# Patient Record
Sex: MALE | Race: WHITE | NOT HISPANIC OR LATINO | Employment: FULL TIME | URBAN - NONMETROPOLITAN AREA
[De-identification: names, ages, dates, MRNs, and addresses within clinical notes are randomized per-mention and may not be internally consistent; named-entity substitution may affect disease eponyms.]

---

## 2018-09-15 ENCOUNTER — OFFICE VISIT (OUTPATIENT)
Dept: URGENT CARE | Facility: PHYSICIAN GROUP | Age: 28
End: 2018-09-15
Payer: COMMERCIAL

## 2018-09-15 VITALS
RESPIRATION RATE: 14 BRPM | WEIGHT: 265 LBS | TEMPERATURE: 98.6 F | SYSTOLIC BLOOD PRESSURE: 128 MMHG | OXYGEN SATURATION: 98 % | HEIGHT: 76 IN | DIASTOLIC BLOOD PRESSURE: 80 MMHG | HEART RATE: 66 BPM | BODY MASS INDEX: 32.27 KG/M2

## 2018-09-15 VITALS
RESPIRATION RATE: 16 BRPM | WEIGHT: 262.5 LBS | SYSTOLIC BLOOD PRESSURE: 120 MMHG | HEIGHT: 76 IN | DIASTOLIC BLOOD PRESSURE: 78 MMHG | TEMPERATURE: 98.4 F | OXYGEN SATURATION: 98 % | BODY MASS INDEX: 31.97 KG/M2 | HEART RATE: 83 BPM

## 2018-09-15 DIAGNOSIS — H93.8X9 SWELLING OF EAR, UNSPECIFIED LATERALITY: ICD-10-CM

## 2018-09-15 DIAGNOSIS — M95.11 CAULIFLOWER RIGHT EAR: ICD-10-CM

## 2018-09-15 DIAGNOSIS — S00.431A HEMATOMA OF RIGHT PINNA, INITIAL ENCOUNTER: ICD-10-CM

## 2018-09-15 PROCEDURE — 99214 OFFICE O/P EST MOD 30 MIN: CPT | Performed by: FAMILY MEDICINE

## 2018-09-15 PROCEDURE — 99214 OFFICE O/P EST MOD 30 MIN: CPT | Performed by: NURSE PRACTITIONER

## 2018-09-15 RX ORDER — KETOROLAC TROMETHAMINE 30 MG/ML
60 INJECTION, SOLUTION INTRAMUSCULAR; INTRAVENOUS ONCE
Status: COMPLETED | OUTPATIENT
Start: 2018-09-15 | End: 2018-09-15

## 2018-09-15 RX ORDER — AMOXICILLIN AND CLAVULANATE POTASSIUM 875; 125 MG/1; MG/1
1 TABLET, FILM COATED ORAL 2 TIMES DAILY
Qty: 20 TAB | Refills: 0 | Status: SHIPPED | OUTPATIENT
Start: 2018-09-15 | End: 2018-09-25

## 2018-09-15 RX ADMIN — KETOROLAC TROMETHAMINE 60 MG: 30 INJECTION, SOLUTION INTRAMUSCULAR; INTRAVENOUS at 16:32

## 2018-09-15 ASSESSMENT — PAIN SCALES - GENERAL
PAINLEVEL: 1=MINIMAL PAIN
PAINLEVEL: 1=MINIMAL PAIN

## 2018-09-15 ASSESSMENT — ENCOUNTER SYMPTOMS
CHILLS: 0
FEVER: 0

## 2018-09-15 NOTE — PROGRESS NOTES
"Subjective:       Chief Complaint   Patient presents with   • Otalgia     swollen lobe. Right side                 Otalgia -  Rt ear  This is a new problem. The current episode started 2 wks ago. The problem occurs constantly. The problem has been unchanged.   He c/o constant throbbing rt ear pain after some trauma associated with martial arts.   Ear is TTP.   Denies fever, drainage.        Pertinent negatives include no abdominal pain, chest pain, chills, fever, headaches, joint swelling, myalgias, nausea, neck pain, rash or visual change   Social hx - denies tobacco, alcohol, drug use      Past medical history was unremarkable and not pertinent to current issue      Review of Systems   Constitutional: Negative for fever and chills.   HENT:   Negative for hearing loss and tinnitus.    Respiratory: no cough. Negative for hemoptysis, shortness of breath and wheezing.    Cardiovascular: Negative for chest pain, palpitations and leg swelling.   Gastrointestinal: Negative for nausea and abdominal pain.   Musculoskeletal: Negative for myalgias, joint swelling and neck pain.   Skin: Negative for rash.   Neurological: Negative for headaches.   All other systems reviewed and are negative.         Objective:     Blood pressure 128/80, pulse 66, temperature 37 °C (98.6 °F), resp. rate 14, height 1.93 m (6' 4\"), weight 120.2 kg (265 lb), SpO2 98 %.      Physical Exam   Constitutional: Vital signs are normal. She is active. No distress.   HENT:   Head: There is normal jaw occlusion.   Right Ear:  There is erythema, edema at helix, the antihelix, and the whitney.   + TTP, but area is not fluctuant or indurated , but it is warm to touch    upon palpation of the tragus.  There is erythema, edema, and narrowing of the external auditory canal.   Slight serous discharge noted  Left Ear: External ear normal. Tympanic membrane is normal  Nose:   No nasal discharge.   Mouth/Throat: Mucous membranes are moist. No oral lesions.  No " "erythema. No oropharyngeal exudate, pharynx swelling or pharynx petechiae. No  exudate.   Eyes: Conjunctivae and EOM are normal. Pupils are equal, round, and reactive to light. Right eye exhibits no discharge. Left eye exhibits no discharge.   Neck: Normal range of motion. Neck supple.  No adenopathy  Cardiovascular: Normal rate and regular rhythm.  Pulses are palpable.    No murmur heard.  Pulmonary/Chest: Effort normal and breath sounds normal. There is normal air entry. No respiratory distress. no wheezes, rhonchi,  retraction.   Musculoskeletal:   no edema.   Neurological: A/O x 3.   CN 2-12 intact   Skin: Skin is warm. Capillary refill takes less than 3 seconds. No purpura and no rash noted. Patient is not diaphoretic. No jaundice or pallor.   Nursing note and vitals reviewed.              Assessment/Plan:       1. Hematoma of right pinna, initial encounter    Will start on bactrim for possible infection, but should be drained by ENT     - REFERRAL TO ENT      - ketorolac (TORADOL) injection 60 mg; 2 mL by Intramuscular route Once.        *after pt left, I noticed that he saw a different urgent care provider earlier in the day, who recommended similar treatment.   I called him to make sure that I do not duplicate treatment and he states that he was prescribed Augmentin, but did not start it yet.    He explained that he was looking for a \"second opinion\", but he neglected to offer me this information.   I advised him to only take the bactrim, not the Augmentin and reassured him that the proper treatment is drainage by ENT.         "

## 2018-09-15 NOTE — PATIENT INSTRUCTIONS
Auricle Injuries  You have an injury to your external ear (auricle). The ear has a layer of skin over cartilage. A cut or bruise to the ear can separate the skin from the cartilage underneath. This can cause problems with healing if blood gathers between the skin and the cartilage. Permanent damage to the ear may result if the excess blood is not drained within 1 to 2 days.  Stitches, tape, or tissue glue may be used to close a cut. A pressure bandage may be used to keep blood from forming under the injured skin. If there is a lot of blood present (hematoma), a needle aspiration may be needed to remove it. You must have the ear checked within 1 to 2 days or as directed if you have had this type of injury. This is see if the blood has accumulated again. Call your caregiver for a follow-up exam as recommended.   SEEK IMMEDIATE MEDICAL CARE IF:  · You develop severe pain.  · You develop a fever or pus like drainage.  · You have increased hearing loss or other problems.  MAKE SURE YOU:   · Understand these instructions.  · Will watch your condition.  · Will get help right away if you are not doing well or get worse.  Document Released: 12/18/2006 Document Revised: 03/11/2013 Document Reviewed: 06/05/2008  [employer]® Patient Information ©2014 [employer].

## 2019-03-11 ENCOUNTER — OFFICE VISIT (OUTPATIENT)
Dept: URGENT CARE | Facility: PHYSICIAN GROUP | Age: 29
End: 2019-03-11
Payer: COMMERCIAL

## 2019-03-11 VITALS
HEART RATE: 60 BPM | OXYGEN SATURATION: 100 % | RESPIRATION RATE: 16 BRPM | WEIGHT: 267 LBS | TEMPERATURE: 98.7 F | BODY MASS INDEX: 32.5 KG/M2

## 2019-03-11 DIAGNOSIS — S61.411A LACERATION OF RIGHT HAND WITHOUT FOREIGN BODY, INITIAL ENCOUNTER: ICD-10-CM

## 2019-03-11 PROCEDURE — 90715 TDAP VACCINE 7 YRS/> IM: CPT | Performed by: PHYSICIAN ASSISTANT

## 2019-03-11 PROCEDURE — 12001 RPR S/N/AX/GEN/TRNK 2.5CM/<: CPT | Performed by: PHYSICIAN ASSISTANT

## 2019-03-11 PROCEDURE — 90471 IMMUNIZATION ADMIN: CPT | Performed by: PHYSICIAN ASSISTANT

## 2019-03-11 RX ORDER — CEPHALEXIN 500 MG/1
500 CAPSULE ORAL 4 TIMES DAILY
Qty: 20 CAP | Refills: 0 | Status: SHIPPED | OUTPATIENT
Start: 2019-03-11 | End: 2019-03-16

## 2019-03-12 NOTE — PROGRESS NOTES
Chief Complaint   Patient presents with   • Hand Laceration     R hand happened this morning/        HISTORY OF PRESENT ILLNESS: Patient is a 29 y.o. male who presents today for the following:    Patient comes in for evaluation of a laceration he sustained on his right approximately 12 hours prior to arrival.  He cut it with a piece of plastic at home.  He covered it up and went to work.  His last tetanus shot was more than 10 years ago.  He denies any distal paresthesias and has full range of motion.    Patient Active Problem List    Diagnosis Date Noted   • Radiculitis 04/05/2016   • Visual disturbance 12/18/2015   • Chronic intermittent hypoxia with obstructive sleep apnea 12/01/2015   • Low back strain 08/14/2015   • Sleep apnea 08/14/2015   • Chronic pain of left knee 08/14/2015   • Scalp alopecia 02/24/2015   • H/O folliculitis 02/24/2015       Allergies:Nkda [no known drug allergy]    Current Outpatient Prescriptions Ordered in [facility]   Medication Sig Dispense Refill   • cephALEXin (KEFLEX) 500 MG Cap Take 1 Cap by mouth 4 times a day for 5 days. 20 Cap 0   • methylPREDNISolone (MEDROL DOSPACK) 4 MG Tab   0   • Oxycodone-Acetaminophen (PERCOCET-10)  MG Tab Take 1 Tab by mouth every four hours as needed for Moderate Pain or Severe Pain. 80 Tab 0   • ondansetron (ZOFRAN) 8 MG Tab Take 1 Tab by mouth every 8 hours as needed for Nausea/Vomiting. 15 Tab 0   • Cyanocobalamin (VITAMIN B 12 PO) Take  by mouth every day.     • Multiple Vitamin (ONE-A-DAY MENS PO) Take  by mouth every day.     • cyclobenzaprine (FLEXERIL) 10 MG Tab Take 1 Tab by mouth 3 times a day as needed. 30 Tab 0     No current Epic-ordered facility-administered medications on file.        Past Medical History:   Diagnosis Date   • Anesthesia     PONV   • Pain    • Scalp alopecia 2/24/2015   • Sleep apnea     cpap   • Snoring        Social History   Substance Use Topics   • Smoking status: Never Smoker   • Smokeless tobacco: Never Used   •  Alcohol use No      Comment: occassional       Family Status   Relation Status   • Mo Alive   • Fa Alive   • Bro Alive   • MGMo (Not Specified)     Family History   Problem Relation Age of Onset   • Diabetes Maternal Grandmother    • Hypertension Maternal Grandmother        Review of Systems:    Constitutional ROS: No unexpected change in weight, No weakness, No fatigue  Eye ROS: No recent significant change in vision, No eye pain, redness, discharge  Ear ROS: No drainage, No tinnitus or vertigo, No recent change in hearing  Mouth/Throat ROS: No teeth or gum problems, No bleeding gums, No tongue complaints  Neck ROS: No swollen glands, No significant pain in neck  Pulmonary ROS: No chronic cough, sputum, or hemoptysis, No dyspnea on exertion, No wheezing  Cardiovascular ROS: No diaphoresis, No edema, No palpitations  Gastrointestinal ROS: No change in bowel habits, No significant change in appetite, No nausea, vomiting, diarrhea, or constipation  Musculoskeletal/Extremities ROS: Laceration right palm.      Exam:  Pulse 60, temperature 37.1 °C (98.7 °F), temperature source Temporal, resp. rate 16, weight 121.1 kg (267 lb), SpO2 100 %.  General: Well developed, well nourished. No distress.  Pulmonary: Unlabored respiratory effort.   Skin: 2 cm laceration on the right palm.  Not actively bleeding.  Psych: Normal mood. Alert and oriented x3. Judgment and insight is normal.    LACERATION REPAIR PROCEDURE NOTE  The patient's identification was confirmed and consent was obtained.  This procedure was performed by Bertha Gamez PA-C at 1900 hrs.  Site: Right palm; irrigated with copious amounts of saline  Sterile procedures observed  Anesthetic used (type and amt): 2% lidocaine without epinephrine, 3 mL  Suture type/size: 3-0 Ethilon  Length: 2 cm  # of Sutures/staples: #4 interrupted    Complexity: Simple     Tetanus: Updated in clinic today    Assessment/Plan:  Discussed wound care at home.  Return to clinic in 10 days  for suture removal, sooner for any signs of infection as discussed in clinic.  Contingent antibiotics provided to the patient for any signs of infection over the next several days.  1. Laceration of right hand without foreign body, initial encounter  Tdap =>8yo IM    cephALEXin (KEFLEX) 500 MG Cap

## 2020-03-04 ENCOUNTER — OFFICE VISIT (OUTPATIENT)
Dept: URGENT CARE | Facility: PHYSICIAN GROUP | Age: 30
End: 2020-03-04
Payer: COMMERCIAL

## 2020-03-04 VITALS
WEIGHT: 278 LBS | TEMPERATURE: 97.8 F | DIASTOLIC BLOOD PRESSURE: 86 MMHG | OXYGEN SATURATION: 97 % | SYSTOLIC BLOOD PRESSURE: 130 MMHG | HEART RATE: 88 BPM | BODY MASS INDEX: 33.84 KG/M2 | RESPIRATION RATE: 16 BRPM

## 2020-03-04 DIAGNOSIS — L03.115 CELLULITIS OF RIGHT LOWER EXTREMITY: ICD-10-CM

## 2020-03-04 DIAGNOSIS — S81.801A LEG WOUND, RIGHT, INITIAL ENCOUNTER: ICD-10-CM

## 2020-03-04 PROCEDURE — 99214 OFFICE O/P EST MOD 30 MIN: CPT | Performed by: NURSE PRACTITIONER

## 2020-03-04 RX ORDER — CEPHALEXIN 500 MG/1
500 CAPSULE ORAL 4 TIMES DAILY
Qty: 20 CAP | Refills: 0 | Status: SHIPPED | OUTPATIENT
Start: 2020-03-04 | End: 2020-03-09

## 2020-03-04 RX ORDER — SULFAMETHOXAZOLE AND TRIMETHOPRIM 800; 160 MG/1; MG/1
1 TABLET ORAL 2 TIMES DAILY
Qty: 20 TAB | Refills: 0 | Status: SHIPPED | OUTPATIENT
Start: 2020-03-04 | End: 2020-03-14

## 2020-03-04 ASSESSMENT — ENCOUNTER SYMPTOMS
LEG SWELLING: 1
FEVER: 0

## 2020-03-05 ASSESSMENT — ENCOUNTER SYMPTOMS
NAUSEA: 0
JOINT SWELLING: 0
SHORTNESS OF BREATH: 0
WEAKNESS: 0

## 2020-03-05 NOTE — PROGRESS NOTES
Subjective:     Vikas Amos is a 30 y.o. male who presents for Leg Swelling (had a laceration 2wks ago/ R lower leg poss wound infection/ pain to touch, redness )      Hit right shin on trailer, through his pants 2 weeks ago. Has been cleaning wound with dressing changes, hydrogen peroxide and alcohol. Leg has been more swollen recenlty, with increasing redness today. Tenderness to touch around cut. Pain 2/10.  No hx of DM or delayed wound healing. Tetanus in 2019.         Leg Swelling   This is a new problem. The current episode started 1 to 4 weeks ago. The problem occurs daily. The problem has been rapidly worsening. Pertinent negatives include no fever, joint swelling, nausea, rash or weakness.       Past Medical History:   Diagnosis Date   • Anesthesia     PONV   • Pain    • Scalp alopecia 2/24/2015   • Sleep apnea     cpap   • Snoring        Past Surgical History:   Procedure Laterality Date   • UVULOPHARYNGOPALATOPLASTY N/A 12/1/2015    Procedure: UVULOPHARYNGOPALATOPLASTY for: palatopharyngoplasty ;  Surgeon: Karlo De Dios M.D.;  Location: SURGERY SAME DAY [facility];  Service:    • SEPTOPLASTY N/A 12/1/2015    Procedure: SEPTOPLASTY;  Surgeon: Karlo De Dios M.D.;  Location: SURGERY SAME DAY [facility];  Service:    • TURBINATE REDUCTION Bilateral 12/1/2015    Procedure: TURBINATE REDUCTION resection with submucosal approach;  Surgeon: Kalro De Dios M.D.;  Location: SURGERY SAME DAY [facility];  Service:    • TONSILLECTOMY Bilateral 12/1/2015    Procedure: TONSILLECTOMY;  Surgeon: Karlo De Dios M.D.;  Location: SURGERY SAME DAY [facility];  Service:    • WRIST ORIF  12/2/08    Performed by PEGGY PANIAGUA at [facility]   • HERNIA REP ING CHILD  1991   • HERNIA REPAIR     • KNEE RECONSTRUCTION Right     x 3 surgeries       Social History     Socioeconomic History   • Marital status: Single     Spouse name: Not on file   • Number of children: Not on file   • Years of  education: Not on file   • Highest education level: Not on file   Occupational History   • Not on file   Social Needs   • Financial resource strain: Not on file   • Food insecurity     Worry: Not on file     Inability: Not on file   • Transportation needs     Medical: Not on file     Non-medical: Not on file   Tobacco Use   • Smoking status: Never Smoker   • Smokeless tobacco: Never Used   Substance and Sexual Activity   • Alcohol use: No     Comment: occassional   • Drug use: No   • Sexual activity: Not Currently     Comment: SINGLE   Lifestyle   • Physical activity     Days per week: Not on file     Minutes per session: Not on file   • Stress: Not on file   Relationships   • Social connections     Talks on phone: Not on file     Gets together: Not on file     Attends [employer] service: Not on file     Active member of club or organization: Not on file     Attends meetings of clubs or organizations: Not on file     Relationship status: Not on file   • Intimate partner violence     Fear of current or ex partner: Not on file     Emotionally abused: Not on file     Physically abused: Not on file     Forced sexual activity: Not on file   Other Topics Concern   • Not on file   Social History Narrative   • Not on file        Family History   Problem Relation Age of Onset   • Diabetes Maternal Grandmother    • Hypertension Maternal Grandmother         Allergies   Allergen Reactions   • Nkda [No Known Drug Allergy]        Review of Systems   Constitutional: Negative for fever.   Respiratory: Negative for shortness of breath.    Cardiovascular: Positive for leg swelling.   Gastrointestinal: Negative for nausea.   Musculoskeletal: Negative for joint swelling.   Skin: Negative for rash.   Neurological: Negative for weakness.   All other systems reviewed and are negative.       Objective:   /86   Pulse 88   Temp 36.6 °C (97.8 °F) (Temporal)   Resp 16   Wt (!) 126.1 kg (278 lb)   SpO2 97%   BMI 33.84 kg/m²          Physical Exam  Vitals signs reviewed.   Constitutional:       General: He is not in acute distress.     Appearance: Normal appearance. He is well-developed. He is not ill-appearing.   HENT:      Head: Normocephalic and atraumatic.      Right Ear: External ear normal.      Left Ear: External ear normal.      Nose: Nose normal.   Eyes:      Conjunctiva/sclera: Conjunctivae normal.   Neck:      Musculoskeletal: Normal range of motion.   Cardiovascular:      Rate and Rhythm: Normal rate and regular rhythm.   Pulmonary:      Effort: Pulmonary effort is normal.   Musculoskeletal: Normal range of motion.      Right lower leg: He exhibits tenderness and swelling. He exhibits no bony tenderness. Edema present.   Skin:     General: Skin is warm and dry.      Findings: Erythema and wound present.          Neurological:      General: No focal deficit present.      Mental Status: He is alert and oriented to person, place, and time.      GCS: GCS eye subscore is 4. GCS verbal subscore is 5. GCS motor subscore is 6.   Psychiatric:         Mood and Affect: Mood normal.         Speech: Speech normal.         Behavior: Behavior normal.         Thought Content: Thought content normal.         Judgment: Judgment normal.         Assessment/Plan:   1. Cellulitis of right lower extremity  - cephALEXin (KEFLEX) 500 MG Cap; Take 1 Cap by mouth 4 times a day for 5 days.  Dispense: 20 Cap; Refill: 0  - sulfamethoxazole-trimethoprim (BACTRIM DS) 800-160 MG tablet; Take 1 Tab by mouth 2 times a day for 10 days.  Dispense: 20 Tab; Refill: 0    2. Leg wound, right, initial encounter  -Monitor for increased redness or streaking,and follow up immediatly. Wound marked.  -Daily dressing changes. Avoid hydrogen peroxide use.  -Elevate leg for swelling.  -Wound check in 2-3 days.      The wound is cleansed and dressed. The patient is alerted to watch for any increasing signs of infection (redness, pus, pain, increased swelling or fever). Follow up  emergently for weakness, fevers, elevated heat rate.    Differential diagnosis, natural history, supportive care, and indications for immediate follow-up discussed.

## 2021-04-15 NOTE — PATIENT INSTRUCTIONS
Cellulitis, Adult  Cellulitis is a skin infection. The infected area is usually red and tender. This condition occurs most often in the arms and lower legs. The infection can travel to the muscles, blood, and underlying tissue and become serious. It is very important to get treated for this condition.  What are the causes?  Cellulitis is caused by bacteria. The bacteria enter through a break in the skin, such as a cut, burn, insect bite, open sore, or crack.  What increases the risk?  This condition is more likely to occur in people who:  · Have a weak defense system (immune system).  · Have open wounds on the skin such as cuts, burns, bites, and scrapes. Bacteria can enter the body through these open wounds.  · Are older.  · Have diabetes.  · Have a type of long-lasting (chronic) liver disease (cirrhosis) or kidney disease.  · Use IV drugs.  What are the signs or symptoms?  Symptoms of this condition include:  · Redness, streaking, or spotting on the skin.  · Swollen area of the skin.  · Tenderness or pain when an area of the skin is touched.  · Warm skin.  · Fever.  · Chills.  · Blisters.  How is this diagnosed?  This condition is diagnosed based on a medical history and physical exam. You may also have tests, including:  · Blood tests.  · Lab tests.  · Imaging tests.  How is this treated?  Treatment for this condition may include:  · Medicines, such as antibiotic medicines or antihistamines.  · Supportive care, such as rest and application of cold or warm cloths (cold or warm compresses) to the skin.  · Hospital care, if the condition is severe.  The infection usually gets better within 1-2 days of treatment.  Follow these instructions at home:  · Take over-the-counter and prescription medicines only as told by your health care provider.  · If you were prescribed an antibiotic medicine, take it as told by your health care provider. Do not stop taking the antibiotic even if you start to feel better.  · Drink  enough fluid to keep your urine clear or pale yellow.  · Do not touch or rub the infected area.  · Raise (elevate) the infected area above the level of your heart while you are sitting or lying down.  · Apply warm or cold compresses to the affected area as told by your health care provider.  · Keep all follow-up visits as told by your health care provider. This is important. These visits let your health care provider make sure a more serious infection is not developing.  Contact a health care provider if:  · You have a fever.  · Your symptoms do not improve within 1-2 days of starting treatment.  · Your bone or joint underneath the infected area becomes painful after the skin has healed.  · Your infection returns in the same area or another area.  · You notice a swollen bump in the infected area.  · You develop new symptoms.  · You have a general ill feeling (malaise) with muscle aches and pains.  Get help right away if:  · Your symptoms get worse.  · You feel very sleepy.  · You develop vomiting or diarrhea that persists.  · You notice red streaks coming from the infected area.  · Your red area gets larger or turns dark in color.  This information is not intended to replace advice given to you by your health care provider. Make sure you discuss any questions you have with your health care provider.  Document Released: 09/27/2006 Document Revised: 04/27/2017 Document Reviewed: 10/26/2016  [employer] Patient Education © 2017 [employer]     Not applicable

## 2021-09-21 ENCOUNTER — OFFICE VISIT (OUTPATIENT)
Dept: MEDICAL GROUP | Facility: PHYSICIAN GROUP | Age: 31
End: 2021-09-21
Payer: COMMERCIAL

## 2021-09-21 VITALS
SYSTOLIC BLOOD PRESSURE: 132 MMHG | BODY MASS INDEX: 32.39 KG/M2 | RESPIRATION RATE: 20 BRPM | HEIGHT: 76 IN | DIASTOLIC BLOOD PRESSURE: 60 MMHG | WEIGHT: 266 LBS | TEMPERATURE: 98.3 F | HEART RATE: 85 BPM | OXYGEN SATURATION: 95 %

## 2021-09-21 DIAGNOSIS — Z76.89 ENCOUNTER TO ESTABLISH CARE: ICD-10-CM

## 2021-09-21 DIAGNOSIS — R53.83 FATIGUE, UNSPECIFIED TYPE: ICD-10-CM

## 2021-09-21 DIAGNOSIS — Z13.6 SCREENING FOR CARDIOVASCULAR CONDITION: ICD-10-CM

## 2021-09-21 DIAGNOSIS — Z20.822 CLOSE EXPOSURE TO COVID-19 VIRUS: ICD-10-CM

## 2021-09-21 PROCEDURE — 99213 OFFICE O/P EST LOW 20 MIN: CPT | Mod: CS | Performed by: NURSE PRACTITIONER

## 2021-09-21 ASSESSMENT — PATIENT HEALTH QUESTIONNAIRE - PHQ9: CLINICAL INTERPRETATION OF PHQ2 SCORE: 0

## 2021-09-22 NOTE — ASSESSMENT & PLAN NOTE
Very pleasant 31 oh male presents today to establish care I would like to have blood work done for cardiovascular screening as well as evaluation of fatigue. He is currently not Covid vaccinated and is requesting a Covid antibody test. Discussed with him the nature of the Covid antibody screen as well as the need to still be vaccinated even if it comes back positive.  Discussed safe sex habits.

## 2021-09-22 NOTE — ASSESSMENT & PLAN NOTE
Patient reports that he has had chronic fatigue for several years. He reports that it was worse prior to having oral airway reconstruction due to extreme sleep apnea. He has recently in the past 3 to 6 months switched to a keto diet which he also has seen some improvement of his fatigue. Today he is requesting to check testosterone levels. I do feel this is appropriate and testosterone serum levels were ordered. He denies any decrease in libido, difficulty with erections, or gynecomastia.

## 2021-09-22 NOTE — PROGRESS NOTES
Chief Complaint   Patient presents with   • Establish Care     requesting labs       Subjective:     HPI:   Vikas Amos is a 31 y.o. male here to discuss the evaluation and management of:      Fatigue  Patient reports that he has had chronic fatigue for several years. He reports that it was worse prior to having oral airway reconstruction due to extreme sleep apnea. He has recently in the past 3 to 6 months switched to a keto diet which he also has seen some improvement of his fatigue. Today he is requesting to check testosterone levels. I do feel this is appropriate and testosterone serum levels were ordered. He denies any decrease in libido, difficulty with erections, or gynecomastia.      Encounter to establish care  Very pleasant 31 oh male presents today to establish care I would like to have blood work done for cardiovascular screening as well as evaluation of fatigue. He is currently not Covid vaccinated and is requesting a Covid antibody test. Discussed with him the nature of the Covid antibody screen as well as the need to still be vaccinated even if it comes back positive.  Discussed safe sex habits.            ROS:  Gen: Positive per HPI  Eyes: no changes in vision  ENT: no sore throat, no hearing loss, no bloody nose  Pulm: no sob, no cough  CV: no chest pain, no palpitations  GI: no nausea/vomiting, no diarrhea  : no dysuria  MSk: no myalgias  Skin: no rash  Neuro: no headaches, no numbness/tingling  Heme/Lymph: no easy bruising    Allergies   Allergen Reactions   • Nkda [No Known Drug Allergy]        Current medicines (including changes today)  Current Outpatient Medications   Medication Sig Dispense Refill   • Vitamin D 12.5 MCG/0.25ML Liquid Take 100,000 Units by mouth.     • Cyanocobalamin (VITAMIN B 12 PO) Take  by mouth every day.     • Multiple Vitamin (ONE-A-DAY MENS PO) Take  by mouth every day.       No current facility-administered medications for this visit.       Social  "History     Tobacco Use   • Smoking status: Never Smoker   • Smokeless tobacco: Never Used   Vaping Use   • Vaping Use: Never used   Substance Use Topics   • Alcohol use: Yes     Comment: occassional   • Drug use: No       Patient Active Problem List    Diagnosis Date Noted   • Fatigue 09/21/2021   • Encounter to establish care 09/21/2021   • Radiculitis 04/05/2016   • Visual disturbance 12/18/2015   • Chronic intermittent hypoxia with obstructive sleep apnea 12/01/2015   • Low back strain 08/14/2015   • Sleep apnea 08/14/2015   • Chronic pain of left knee 08/14/2015   • Scalp alopecia 02/24/2015   • H/O folliculitis 02/24/2015       Family History   Problem Relation Age of Onset   • No Known Problems Father    • Diabetes Maternal Grandmother    • Hypertension Maternal Grandmother    • No Known Problems Maternal Grandfather    • No Known Problems Paternal Grandfather           Objective:       /60 (BP Location: Left arm, Patient Position: Sitting, BP Cuff Size: Large adult)   Pulse 85   Temp 36.8 °C (98.3 °F) (Temporal)   Resp 20   Ht 1.93 m (6' 4\")   Wt 121 kg (266 lb)   SpO2 95%  Body mass index is 32.38 kg/m².    Physical Exam:  Constitutional: Well-developed and well-nourished male in NAD. Not diaphoretic. No distress.   Skin: warm, dry, intact, no evidence of rash or concerning lesions  Head: Atraumatic without lesions.  Eyes: Conjunctivae and extraocular motions are normal. Pupils are equal, round, and reactive to light. No scleral icterus.   Ears:  External ears unremarkable. TMs normal; bilaterally  Mouth/Throat: Tongue normal. Oropharynx is clear and moist. Posterior pharynx without erythema or exudates.  Neck: Supple, trachea midline. No thyromegaly present. No cervical or supraclavicular lymphadenopathy.  Cardiovascular: Regular rate and rhythm without murmur. Radial pulses are intact and equal bilaterally.  Pulmonary: Clear to ausculation. Normal effort. No rales, ronchi, or " wheezing.  Abdomen: Soft, non tender, and without distention. Active bowel sounds in all four quadrants. No rebound, guarding, masses   Extremities: No cyanosis, clubbing, erythema, nor edema.   Neurological: Alert and oriented x 3. No cranial nerve deficit. 5/5 myotomes. Sensation intact.   Psychiatric:  Behavior, mood, and affect are appropriate.           Assessment and Plan:     The following treatment plan was discussed:      1. Encounter to establish care    2. Fatigue, unspecified type  Is a new condition. Discussed with patient's importance of  appropriate sleep hygiene, appropriate nutrition, and ensuring adequate hydration.  - TESTOSTERONE SERUM; Future  - TSH WITH REFLEX TO FT4; Future    3. Close exposure to COVID-19 virus  - SARS CoV-2 Ab, Total (non-vaccine); Future    4. Screening for cardiovascular condition  - Comp Metabolic Panel; Future  - Lipid Profile; Future    Other orders  - Vitamin D 12.5 MCG/0.25ML Liquid; Take 100,000 Units by mouth.      Any change or worsening of signs or symptoms, patient encouraged to follow-up or report to emergency room for further evaluation. Patient verbalizes understanding and agrees.    Follow-Up: Return in about 2 weeks (around 10/5/2021) for Follow up labs.      PLEASE NOTE: This dictation was created using voice recognition software. I have made every reasonable attempt to correct obvious errors, but I expect that there are errors of grammar and possibly content that I did not discover before finalizing the note.

## 2021-09-28 ENCOUNTER — HOSPITAL ENCOUNTER (OUTPATIENT)
Dept: LAB | Facility: MEDICAL CENTER | Age: 31
End: 2021-09-28
Attending: NURSE PRACTITIONER
Payer: COMMERCIAL

## 2021-09-28 DIAGNOSIS — R53.83 FATIGUE, UNSPECIFIED TYPE: ICD-10-CM

## 2021-09-28 DIAGNOSIS — Z13.6 SCREENING FOR CARDIOVASCULAR CONDITION: ICD-10-CM

## 2021-09-28 DIAGNOSIS — Z20.822 CLOSE EXPOSURE TO COVID-19 VIRUS: ICD-10-CM

## 2021-09-28 LAB
ALBUMIN SERPL BCP-MCNC: 4.7 G/DL (ref 3.2–4.9)
ALBUMIN/GLOB SERPL: 1.7 G/DL
ALP SERPL-CCNC: 66 U/L (ref 30–99)
ALT SERPL-CCNC: 70 U/L (ref 2–50)
ANION GAP SERPL CALC-SCNC: 13 MMOL/L (ref 7–16)
AST SERPL-CCNC: 27 U/L (ref 12–45)
BILIRUB SERPL-MCNC: 0.5 MG/DL (ref 0.1–1.5)
BUN SERPL-MCNC: 23 MG/DL (ref 8–22)
CALCIUM SERPL-MCNC: 9.4 MG/DL (ref 8.5–10.5)
CHLORIDE SERPL-SCNC: 102 MMOL/L (ref 96–112)
CHOLEST SERPL-MCNC: 196 MG/DL (ref 100–199)
CO2 SERPL-SCNC: 22 MMOL/L (ref 20–33)
CREAT SERPL-MCNC: 0.87 MG/DL (ref 0.5–1.4)
FASTING STATUS PATIENT QL REPORTED: NORMAL
GLOBULIN SER CALC-MCNC: 2.7 G/DL (ref 1.9–3.5)
GLUCOSE SERPL-MCNC: 105 MG/DL (ref 65–99)
HDLC SERPL-MCNC: 35 MG/DL
LDLC SERPL CALC-MCNC: 128 MG/DL
POTASSIUM SERPL-SCNC: 4.2 MMOL/L (ref 3.6–5.5)
PROT SERPL-MCNC: 7.4 G/DL (ref 6–8.2)
SARS-COV-2 AB SERPL QL IA: NORMAL
SODIUM SERPL-SCNC: 137 MMOL/L (ref 135–145)
TESTOST SERPL-MCNC: 239 NG/DL (ref 175–781)
TRIGL SERPL-MCNC: 163 MG/DL (ref 0–149)
TSH SERPL DL<=0.005 MIU/L-ACNC: 3.03 UIU/ML (ref 0.38–5.33)

## 2021-09-28 PROCEDURE — 36415 COLL VENOUS BLD VENIPUNCTURE: CPT

## 2021-09-28 PROCEDURE — 80053 COMPREHEN METABOLIC PANEL: CPT

## 2021-09-28 PROCEDURE — 84403 ASSAY OF TOTAL TESTOSTERONE: CPT

## 2021-09-28 PROCEDURE — 84443 ASSAY THYROID STIM HORMONE: CPT

## 2021-09-28 PROCEDURE — 86769 SARS-COV-2 COVID-19 ANTIBODY: CPT

## 2021-09-28 PROCEDURE — 80061 LIPID PANEL: CPT

## 2021-10-05 ENCOUNTER — OFFICE VISIT (OUTPATIENT)
Dept: MEDICAL GROUP | Facility: PHYSICIAN GROUP | Age: 31
End: 2021-10-05
Payer: COMMERCIAL

## 2021-10-05 VITALS
BODY MASS INDEX: 32.27 KG/M2 | RESPIRATION RATE: 12 BRPM | HEIGHT: 76 IN | TEMPERATURE: 97.3 F | SYSTOLIC BLOOD PRESSURE: 124 MMHG | WEIGHT: 265 LBS | OXYGEN SATURATION: 97 % | DIASTOLIC BLOOD PRESSURE: 78 MMHG | HEART RATE: 74 BPM

## 2021-10-05 DIAGNOSIS — E78.2 MIXED HYPERLIPIDEMIA: ICD-10-CM

## 2021-10-05 DIAGNOSIS — R74.01 ELEVATED ALT MEASUREMENT: ICD-10-CM

## 2021-10-05 DIAGNOSIS — R73.01 IMPAIRED FASTING BLOOD SUGAR: ICD-10-CM

## 2021-10-05 PROCEDURE — 99213 OFFICE O/P EST LOW 20 MIN: CPT | Performed by: NURSE PRACTITIONER

## 2021-10-06 NOTE — ASSESSMENT & PLAN NOTE
Component      Latest Ref Rng & Units 9/28/2021   AST(SGOT)      12 - 45 U/L 27   ALT(SGPT)      2 - 50 U/L 70 (H)   Alkaline Phosphatase      30 - 99 U/L 66   Total Bilirubin      0.1 - 1.5 mg/dL 0.5   Albumin      3.2 - 4.9 g/dL 4.7     This is a new condition.  Discussed with patient multiple causes of elevated ALT.  He denies any alcohol use.  He does report he is currently doing the keto diet.  He denies any yellowing of the skin, right upper quadrant pain, nausea or vomiting.  We will recheck labs in 6 months.

## 2021-10-06 NOTE — PATIENT INSTRUCTIONS
"High Cholesterol    High cholesterol is a condition in which the blood has high levels of a white, waxy, fat-like substance (cholesterol). The human body needs small amounts of cholesterol. The liver makes all the cholesterol that the body needs. Extra (excess) cholesterol comes from the food that we eat.  Cholesterol is carried from the liver by the blood through the blood vessels. If you have high cholesterol, deposits (plaques) may build up on the walls of your blood vessels (arteries). Plaques make the arteries narrower and stiffer. Cholesterol plaques increase your risk for heart attack and stroke. Work with your health care provider to keep your cholesterol levels in a healthy range.  What increases the risk?  This condition is more likely to develop in people who:  · Eat foods that are high in animal fat (saturated fat) or cholesterol.  · Are overweight.  · Are not getting enough exercise.  · Have a family history of high cholesterol.  What are the signs or symptoms?  There are no symptoms of this condition.  How is this diagnosed?  This condition may be diagnosed from the results of a blood test.  · If you are older than age 20, your health care provider may check your cholesterol every 4-6 years.  · You may be checked more often if you already have high cholesterol or other risk factors for heart disease.  The blood test for cholesterol measures:  · \"Bad\" cholesterol (LDL cholesterol). This is the main type of cholesterol that causes heart disease. The desired level for LDL is less than 100.  · \"Good\" cholesterol (HDL cholesterol). This type helps to protect against heart disease by cleaning the arteries and carrying the LDL away. The desired level for HDL is 60 or higher.  · Triglycerides. These are fats that the body can store or burn for energy. The desired number for triglycerides is lower than 150.  · Total cholesterol. This is a measure of the total amount of cholesterol in your blood, including LDL " cholesterol, HDL cholesterol, and triglycerides. A healthy number is less than 200.  How is this treated?  This condition is treated with diet changes, lifestyle changes, and medicines.  Diet changes  · This may include eating more whole grains, fruits, vegetables, nuts, and fish.  · This may also include cutting back on red meat and foods that have a lot of added sugar.  Lifestyle changes  · Changes may include getting at least 40 minutes of aerobic exercise 3 times a week. Aerobic exercises include walking, biking, and swimming. Aerobic exercise along with a healthy diet can help you maintain a healthy weight.  · Changes may also include quitting smoking.  Medicines  · Medicines are usually given if diet and lifestyle changes have failed to reduce your cholesterol to healthy levels.  · Your health care provider may prescribe a statin medicine. Statin medicines have been shown to reduce cholesterol, which can reduce the risk of heart disease.  Follow these instructions at home:  Eating and drinking  If told by your health care provider:  · Eat chicken (without skin), fish, veal, shellfish, ground turkey breast, and round or loin cuts of red meat.  · Do not eat fried foods or fatty meats, such as hot dogs and salami.  · Eat plenty of fruits, such as apples.  · Eat plenty of vegetables, such as broccoli, potatoes, and carrots.  · Eat beans, peas, and lentils.  · Eat grains such as barley, rice, couscous, and bulgur wheat.  · Eat pasta without cream sauces.  · Use skim or nonfat milk, and eat low-fat or nonfat yogurt and cheeses.  · Do not eat or drink whole milk, cream, ice cream, egg yolks, or hard cheeses.  · Do not eat stick margarine or tub margarines that contain trans fats (also called partially hydrogenated oils).  · Do not eat saturated tropical oils, such as coconut oil and palm oil.  · Do not eat cakes, cookies, crackers, or other baked goods that contain trans fats.    General instructions  · Exercise as  directed by your health care provider. Increase your activity level with activities such as gardening, walking, and taking the stairs.  · Take over-the-counter and prescription medicines only as told by your health care provider.  · Do not use any products that contain nicotine or tobacco, such as cigarettes and e-cigarettes. If you need help quitting, ask your health care provider.  · Keep all follow-up visits as told by your health care provider. This is important.  Contact a health care provider if:  · You are struggling to maintain a healthy diet or weight.  · You need help to start on an exercise program.  · You need help to stop smoking.  Get help right away if:  · You have chest pain.  · You have trouble breathing.  This information is not intended to replace advice given to you by your health care provider. Make sure you discuss any questions you have with your health care provider.  Document Released: 12/18/2006 Document Revised: 12/21/2018 Document Reviewed: 06/17/2017  Elsevier Patient Education © 2020 [employer]

## 2021-10-06 NOTE — PROGRESS NOTES
Chief Complaint   Patient presents with   • Follow-Up     2 wk fv labs       Subjective:     HPI:   Vikas Amos is a 31 y.o. male here to discuss the evaluation and management of:      Impaired fasting blood sugar  This is a new condition.  Reviewed labs with patient indicating a mildly elevated fasting blood sugar of 105.  Patient does report he fasted for approximately 9 to 10 hours prior to lab draw.  He denies any symptoms of polyuria polydipsia.    Discussed importance of appropriate diet and exercise to help decrease blood sugar levels.  Patient is currently doing the keto diet.     Elevated ALT measurement  Component      Latest Ref Rng & Units 9/28/2021   AST(SGOT)      12 - 45 U/L 27   ALT(SGPT)      2 - 50 U/L 70 (H)   Alkaline Phosphatase      30 - 99 U/L 66   Total Bilirubin      0.1 - 1.5 mg/dL 0.5   Albumin      3.2 - 4.9 g/dL 4.7     This is a new condition.  Discussed with patient multiple causes of elevated ALT.  He denies any alcohol use.  He does report he is currently doing the keto diet.  He denies any yellowing of the skin, right upper quadrant pain, nausea or vomiting.  We will recheck labs in 6 months.    Mixed hyperlipidemia  Component      Latest Ref Rng & Units 9/28/2021   Cholesterol,Tot      100 - 199 mg/dL 196   Triglycerides      0 - 149 mg/dL 163 (H)   HDL      >=40 mg/dL 35 (A)   LDL      <100 mg/dL 128 (H)     The ASCVD Risk score (Sykeston LYNNE Jr., et al., 2013) failed to calculate for the following reasons:    The 2013 ASCVD risk score is only valid for ages 40 to 79    This is a new condition.  Discussed labs with patient.  He is currently doing keto diet.  Discussed with him the importance of incorporating healthy fats in his diet.  He does have a mildly elevated ALT level.  Discussed the  importance of portion control and appropriate exercise.      ROS:  Gen: no fevers/chills, no changes in weight  Eyes: no changes in vision  ENT: no sore throat, no hearing loss, no  bloody nose  Pulm: no sob, no cough  CV: no chest pain, no palpitations  GI: no nausea/vomiting, no diarrhea  : no dysuria  MSk: no myalgias  Skin: no rash  Neuro: no headaches, no numbness/tingling  Heme/Lymph: no easy bruising    Allergies   Allergen Reactions   • Nkda [No Known Drug Allergy]        Current medicines (including changes today)  Current Outpatient Medications   Medication Sig Dispense Refill   • Vitamin D 12.5 MCG/0.25ML Liquid Take 100,000 Units by mouth.     • Cyanocobalamin (VITAMIN B 12 PO) Take  by mouth every day.     • Multiple Vitamin (ONE-A-DAY MENS PO) Take  by mouth every day.       No current facility-administered medications for this visit.       Social History     Tobacco Use   • Smoking status: Never Smoker   • Smokeless tobacco: Never Used   Vaping Use   • Vaping Use: Never used   Substance Use Topics   • Alcohol use: Yes     Comment: occassional   • Drug use: No       Patient Active Problem List    Diagnosis Date Noted   • Impaired fasting blood sugar 10/05/2021   • Mixed hyperlipidemia 10/05/2021   • Elevated ALT measurement 10/05/2021   • Fatigue 09/21/2021   • Encounter to establish care 09/21/2021   • Radiculitis 04/05/2016   • Visual disturbance 12/18/2015   • Chronic intermittent hypoxia with obstructive sleep apnea 12/01/2015   • Low back strain 08/14/2015   • Sleep apnea 08/14/2015   • Chronic pain of left knee 08/14/2015   • Scalp alopecia 02/24/2015   • H/O folliculitis 02/24/2015       Family History   Problem Relation Age of Onset   • No Known Problems Father    • Diabetes Maternal Grandmother    • Hypertension Maternal Grandmother    • No Known Problems Maternal Grandfather    • No Known Problems Paternal Grandfather           Objective:     Hospital Outpatient Visit on 09/28/2021   Component Date Value Ref Range Status   • SARS-CoV-2 Ab Qual 09/28/2021 Non-Reactive  Non-Reactive Final    Comment: **The Roche Dynamo Micropowers Anti-SARS-CoV-2 assay is only for use under  the  Food and Drug Administration’s Emergency Use Authorization**    Elecsys Anti-SARS-CoV-2 is an immunoassay intended for qualitative  detection of antibodies to SARS-CoV-2, for use as an aid in identifying  individuals with an adaptive immune response to SARS-CoV-2, indicating  recent or prior infection. This should not be used to diagnose acute  SARS-CoV-2 infection.  The individual immune response following SARS-CoV-2 infection varies  considerably and might give different results with assays from different  manufacturers. Results of assays from different manufacturers should not  be used interchangeably.    Providers Fact Sheet:   https://www.fda.gov/media/406495/download  Patients (Recipients) Fact Sheet:  https://www.fda.gov/media/198460/download     • TSH 09/28/2021 3.030  0.380 - 5.330 uIU/mL Final    Comment: Reference Range:    Pregnant Females, 1st Trimester  0.050-3.700  Pregnant Females, 2nd Trimester  0.310-4.350  Pregnant Females, 3rd Trimester  0.410-5.180     • Testosterone,Total 09/28/2021 239  175 - 781 ng/dL Final   • Cholesterol,Tot 09/28/2021 196  100 - 199 mg/dL Final   • Triglycerides 09/28/2021 163* 0 - 149 mg/dL Final   • HDL 09/28/2021 35* >=40 mg/dL Final   • LDL 09/28/2021 128* <100 mg/dL Final   • Sodium 09/28/2021 137  135 - 145 mmol/L Final   • Potassium 09/28/2021 4.2  3.6 - 5.5 mmol/L Final   • Chloride 09/28/2021 102  96 - 112 mmol/L Final   • Co2 09/28/2021 22  20 - 33 mmol/L Final   • Anion Gap 09/28/2021 13.0  7.0 - 16.0 Final   • Glucose 09/28/2021 105* 65 - 99 mg/dL Final   • Bun 09/28/2021 23* 8 - 22 mg/dL Final   • Creatinine 09/28/2021 0.87  0.50 - 1.40 mg/dL Final   • Calcium 09/28/2021 9.4  8.5 - 10.5 mg/dL Final   • AST(SGOT) 09/28/2021 27  12 - 45 U/L Final   • ALT(SGPT) 09/28/2021 70* 2 - 50 U/L Final   • Alkaline Phosphatase 09/28/2021 66  30 - 99 U/L Final   • Total Bilirubin 09/28/2021 0.5  0.1 - 1.5 mg/dL Final   • Albumin 09/28/2021 4.7  3.2 - 4.9 g/dL Final   •  "Total Protein 09/28/2021 7.4  6.0 - 8.2 g/dL Final   • Globulin 09/28/2021 2.7  1.9 - 3.5 g/dL Final   • A-G Ratio 09/28/2021 1.7  g/dL Final   • Fasting Status 09/28/2021 Fasting   Final   • GFR If  09/28/2021 >60  >60 mL/min/1.73 m 2 Final   • GFR If Non  09/28/2021 >60  >60 mL/min/1.73 m 2 Final       /78 (BP Location: Left arm, Patient Position: Sitting)   Pulse 74   Temp 36.3 °C (97.3 °F) (Temporal)   Resp 12   Ht 1.93 m (6' 4\")   Wt 120 kg (265 lb)   SpO2 97%  Body mass index is 32.26 kg/m².    Physical Exam:  Constitutional: Well-developed and well-nourished male in NAD. Not diaphoretic. No distress.   Skin: warm, dry, intact, no evidence of rash or concerning lesions  Head: Atraumatic without lesions.  Eyes: Conjunctivae and extraocular motions are normal. Pupils are equal, round, and reactive to light. No scleral icterus.   Cardiovascular: Regular rate and rhythm without murmur. Radial pulses are intact and equal bilaterally.  Pulmonary: Clear to ausculation. Normal effort. No rales, ronchi, or wheezing.  Extremities: No cyanosis, clubbing, erythema, nor edema.   Neurological: Alert and oriented x 3. No cranial nerve deficit. 5/5 myotomes. Sensation intact.   Psychiatric:  Behavior, mood, and affect are appropriate.    Assessment and Plan:     The following treatment plan was discussed:  I have reviewed all labs with patient and answered all questions.    1. Mixed hyperlipidemia  - Lipid Profile; Future    2. Elevated ALT measurement  - Lipid Profile; Future  - HEPATIC FUNCTION PANEL; Future    3. Impaired fasting blood sugar  - Blood Glucose; Future      Any change or worsening of signs or symptoms, patient encouraged to follow-up or report to emergency room for further evaluation. Patient verbalizes understanding and agrees.    Follow-Up: Return in about 6 months (around 4/5/2022) for Follow up labs.      PLEASE NOTE: This dictation was created using voice " recognition software. I have made every reasonable attempt to correct obvious errors, but I expect that there are errors of grammar and possibly content that I did not discover before finalizing the note.

## 2021-10-06 NOTE — ASSESSMENT & PLAN NOTE
Component      Latest Ref Rng & Units 9/28/2021   Cholesterol,Tot      100 - 199 mg/dL 196   Triglycerides      0 - 149 mg/dL 163 (H)   HDL      >=40 mg/dL 35 (A)   LDL      <100 mg/dL 128 (H)     The ASCVD Risk score (Kali BATISTA Jr., et al., 2013) failed to calculate for the following reasons:    The 2013 ASCVD risk score is only valid for ages 40 to 79    This is a new condition.  Discussed labs with patient.  He is currently doing keto diet.  Discussed with him the importance of incorporating healthy fats in his diet.  He does have a mildly elevated ALT level.  Discussed the  importance of portion control and appropriate exercise.

## 2021-10-06 NOTE — ASSESSMENT & PLAN NOTE
This is a new condition.  Reviewed labs with patient indicating a mildly elevated fasting blood sugar of 105.  Patient does report he fasted for approximately 9 to 10 hours prior to lab draw.  He denies any symptoms of polyuria polydipsia.    Discussed importance of appropriate diet and exercise to help decrease blood sugar levels.  Patient is currently doing the keto diet.

## 2022-03-07 ENCOUNTER — OFFICE VISIT (OUTPATIENT)
Dept: MEDICAL GROUP | Facility: PHYSICIAN GROUP | Age: 32
End: 2022-03-07
Payer: COMMERCIAL

## 2022-03-07 VITALS
BODY MASS INDEX: 32.75 KG/M2 | HEIGHT: 75 IN | RESPIRATION RATE: 18 BRPM | WEIGHT: 263.4 LBS | TEMPERATURE: 96.2 F | OXYGEN SATURATION: 100 % | DIASTOLIC BLOOD PRESSURE: 90 MMHG | SYSTOLIC BLOOD PRESSURE: 128 MMHG | HEART RATE: 96 BPM

## 2022-03-07 DIAGNOSIS — R73.01 IMPAIRED FASTING BLOOD SUGAR: ICD-10-CM

## 2022-03-07 DIAGNOSIS — M79.661 PAIN AND SWELLING OF LOWER LEG, RIGHT: ICD-10-CM

## 2022-03-07 DIAGNOSIS — G89.29 CHRONIC PAIN OF RIGHT KNEE: ICD-10-CM

## 2022-03-07 DIAGNOSIS — M79.89 PAIN AND SWELLING OF LOWER LEG, RIGHT: ICD-10-CM

## 2022-03-07 DIAGNOSIS — S39.012D STRAIN OF LUMBAR REGION, SUBSEQUENT ENCOUNTER: ICD-10-CM

## 2022-03-07 DIAGNOSIS — M25.561 CHRONIC PAIN OF RIGHT KNEE: ICD-10-CM

## 2022-03-07 PROCEDURE — 99214 OFFICE O/P EST MOD 30 MIN: CPT | Performed by: NURSE PRACTITIONER

## 2022-03-07 ASSESSMENT — PATIENT HEALTH QUESTIONNAIRE - PHQ9: CLINICAL INTERPRETATION OF PHQ2 SCORE: 0

## 2022-03-07 NOTE — PROGRESS NOTES
Chief Complaint   Patient presents with   • Referral Needed     orthotics       Subjective:     HPI:   Vikas Amos is a 32 y.o. male here to discuss the evaluation and management of:      Chronic pain of right knee  This is a chronic condition with exacerbation.  This condition has been chronic for at least 5 to 7 years.  Patient previously had knee surgery on his meniscus and ACL in  2011, 2013, 2014.  He currently wears a knee brace which was given to him by orthopedics however due to increased exercise with [employer] his upper thigh has become too big for this brace.  He does report that his pain and instability in his right knee is slightly worse.  Pain level 3-10/10 sharp pain at the top of his kneecap and laterally/medially to his kneecap.  He does report positive for swelling of his knee and lower leg on daily.  Positive for popping.   Positive for calf pain.    Denies any instability, buckling, or crepitus.  Home treatment:   Today he is requesting referral to sports medicine orthopedics.    Plan  Ultrasound order was placed today to rule out DVT.  Referral to sports medicine was placed.  Continue to encourage ice and massage therapy.  Tylenol as needed for analgesics.  Decrease weights with [employer].          Low back strain  This is a chronic and ongoing condition with exacerbation.  Patient continues to report that he has bilateral lower back pain due to lack of support in his shoes.  He reports his previous insoles from podiatry are no longer helping with his pain and he was requesting referral to podiatry.    He denies any radiculopathy, saddle paresthesia, or loss of bowel or bladder control.    Plan  Referral was placed to podiatry.  Discussed appropriate back body mechanics.          ROS:  Gen: no fevers/chills, no changes in weight  Pulm: no sob, no cough  CV: no chest pain, no palpitations  GI: no nausea/vomiting, no diarrhea  MSk: Positive per HPI  Neuro: no headaches, no  "numbness/tingling  Heme/Lymph: no easy bruising    Allergies   Allergen Reactions   • Nkda [No Known Drug Allergy]        /90   Pulse 96   Temp (!) 35.7 °C (96.2 °F) (Temporal)   Resp 18   Ht 1.892 m (6' 2.5\")   Wt 119 kg (263 lb 6.4 oz)   SpO2 100%  Body mass index is 33.37 kg/m².    Physical Exam:  Constitutional: Well-developed and well-nourished male in NAD. Not diaphoretic. No distress.   Skin: warm, dry, intact, no evidence of rash or concerning lesions  Eyes: Conjunctivae and extraocular motions are normal. Pupils are equal, round, and reactive to light. No scleral icterus.   Neck: Supple, trachea midline. No thyromegaly present. No cervical or supraclavicular lymphadenopathy.  Cardiovascular: Regular rate and rhythm without murmur. Radial pulses are intact and equal bilaterally.  Pulmonary: Clear to ausculation. Normal effort. No rales, ronchi, or wheezing.   Back: Full ROM, 5/5 LE strength, sensation intact bilaterally in LE, no TTP over spinous processes, paraspinals negative, SI joint positive on left, negative on right, straight leg raise negative    Right knee: Full ROM, full strength, TTP negative, edema negative, varus/valgus stress negative, anterior/posterior drawer negative, Lachman's negative.  Patellar TRT is 2+, sensation intact.  Negative Homans' sign  LEFT knee: Full ROM, full strength, TTP negative, edema negative, varus/valgus stress negative, anterior/posterior drawer negative, Lachman's negative.  Patellar TRT is 2+, sensation intact  Neurological: Alert and oriented x 3.   Psychiatric:  Behavior, mood, and affect are appropriate.    Assessment and Plan:     The following treatment plan was discussed:    1. Chronic pain of right knee  - Referral to Sports Medicine  - Referral to Physical Therapy  - US-EXTREMITY VENOUS LOWER BILAT; Future    2. Strain of lumbar region, subsequent encounter  - Referral to Podiatry  - Referral to Sports Medicine  - Referral to Physical " Therapy    3. Impaired fasting blood sugar  - HEMOGLOBIN A1C; Future    4. Pain and swelling of lower leg, right  - US-EXTREMITY VENOUS LOWER BILAT; Future      Any change or worsening of signs or symptoms, patient encouraged to follow-up or report to emergency room for further evaluation. Patient verbalizes understanding and agrees.    Follow-Up: Return in about 8 weeks (around 5/2/2022) for Follow up labs and knee pain .      PLEASE NOTE: This dictation was created using voice recognition software. I have made every reasonable attempt to correct obvious errors, but I expect that there are errors of grammar and possibly content that I did not discover before finalizing the note.

## 2022-03-07 NOTE — PATIENT INSTRUCTIONS
Lumbar Sprain  A lumbar sprain, which is sometimes called a low-back sprain, is a stretch or tear in the bands of tissue that hold bones and joints together (ligaments) in the lower back (lumbar spine). This type of injury occurs when you overextend or stretch these ligaments beyond their limits.  Lumbar sprains can range from mild to severe. Mild sprains may involve stretching a ligament without tearing it. These may heal in 1-2 weeks. More severe sprains involve tearing of the ligament. These will cause more pain and may take 6-8 weeks to heal.  What are the causes?  This condition may be caused by:  · Trauma, such as a fall or a hit to the body.  · Twisting or overstretching the back. This may result from doing activities that need a lot of energy, such as lifting heavy objects.  What increases the risk?  This injury is more common in:  · Athletes.  · People with obesity.  · People who do repeated lifting, bending, or other movements that involve their back.  What are the signs or symptoms?  Symptoms of this condition may include:  · Sharp or dull pain in the lower back that does not go away. The pain may extend to the buttocks.  · Stiffness or limited range of motion.  · Sudden muscle tightening (spasms).  How is this diagnosed?  This condition may be diagnosed based on:  · Your symptoms.  · Your medical history.  · A physical exam.  · Imaging tests, such as:  ? X-rays.  ? MRI.  How is this treated?  Treatment for this condition may include:  · Resting the injured area.  · Applying heat and cold to the affected area.  · Medicines for pain and inflammation, such as NSAIDs.  · Prescription pain medicine and muscle relaxants may be needed for a short time.  · Physical therapy.  Follow these instructions at home:  Managing pain, stiffness, and swelling         · If directed, put ice on the injured area during the first 24 hours after your injury.  ? Put ice in a plastic bag.  ? Place a towel between your skin and  the bag.  ? Leave the ice on for 20 minutes, 2-3 times a day.  · If directed, apply heat to the affected area as often as told by your health care provider. Use the heat source that your health care provider recommends, such as a moist heat pack or a heating pad.  ? Place a towel between your skin and the heat source.  ? Leave the heat on for 20-30 minutes.  ? Remove the heat if your skin turns bright red. This is especially important if you are unable to feel pain, heat, or cold. You may have a greater risk of getting burned.  Activity  · Rest and return to your normal activities as told by your health care provider. Ask your health care provider what activities are safe for you.  · Do exercises as told by your health care provider.  Medicines  · Take over-the-counter and prescription medicines only as told by your health care provider.  · Ask your health care provider if the medicine prescribed to you:  ? Requires you to avoid driving or using heavy machinery.  ? Can cause constipation. You may need to take these actions to prevent or treat constipation:  § Drink enough fluid to keep your urine pale yellow.  § Take over-the-counter or prescription medicines.  § Eat foods that are high in fiber, such as beans, whole grains, and fresh fruits and vegetables.  § Limit foods that are high in fat and processed sugars, such as fried or sweet foods.  Injury prevention  To prevent a future low-back injury:  · Always warm up properly before physical activity or sports.  · Cool down and stretch after being active.  · Use correct form when playing sports and lifting heavy objects. Bend your knees before you lift heavy objects.  · Use good posture when sitting and standing.  · Stay physically fit and keep a healthy weight.  ? Do at least 150 minutes of moderate-intensity exercise each week, such as brisk walking or water aerobics.  ? Do strength exercises at least 2 times each week.    General instructions  · Do not use any  products that contain nicotine or tobacco, such as cigarettes, e-cigarettes, and chewing tobacco. If you need help quitting, ask your health care provider.  · Keep all follow-up visits as told by your health care provider. This is important.  Contact a health care provider if:  · Your back pain does not improve after 6 weeks of treatment.  · Your symptoms get worse.  Get help right away if:  · Your back pain is severe.  · You are unable to stand or walk.  · You develop pain in your legs.  · You develop weakness in your buttocks or legs.  · You have difficulty controlling when you urinate or when you have a bowel movement.  ? You have frequent, painful, or bloody urination.  ? You have a temperature over 101.0°F (38.3°C).  Summary  · A lumbar sprain, which is sometimes called a low-back sprain, is a stretch or tear in the bands of tissue that hold bones and joints together (ligaments) in the lower back (lumbar spine).  · Rest and return to your normal activities as told by your health care provider. Ask your health care provider what activities are safe for you.  · If directed, apply heat and ice to the affected area as often as told by your health care provider.  · Take over-the-counter and prescription medicines only as told by your health care provider.  · Contact a health care provider if you have new or worsening symptoms.  This information is not intended to replace advice given to you by your health care provider. Make sure you discuss any questions you have with your health care provider.  Document Released: 12/18/2006 Document Revised: 10/17/2019 Document Reviewed: 10/17/2019  Elsevier Patient Education © 2020 [employer]    Chronic Knee Pain, Adult  Knee pain that lasts longer than 3 months is called chronic knee pain. You may have pain in one or both knees. Symptoms of chronic knee pain may also include swelling and stiffness. The most common cause is age-related wear and tear (osteoarthritis) of your  knee joint. Many conditions can cause chronic knee pain.  Treatment depends on the cause. The main treatments are physical therapy and weight loss. It may also be treated with medicines, injections, a knee sleeve or brace, and by using crutches. Rest, ice, compression (pressure), and elevation (RICE) therapy may also be recommended.  Follow these instructions at home:  If you have a knee sleeve or brace:    · Wear it as told by your doctor. Remove it only as told by your doctor.  · Loosen it if your toes:  ? Tingle.  ? Become numb.  ? Turn cold and blue.  · Keep it clean.  · If the sleeve or brace is not waterproof:  ? Do not let it get wet.  ? Remove it if told by your doctor, or cover it with a watertight covering when you take a bath or shower.  Managing pain, stiffness, and swelling         · If told, put heat on your knee. Do this as often as told by your doctor. Use the heat source that your doctor recommends, such as a moist heat pack or a heating pad.  ? If you have a removable sleeve or brace, remove it as told by your doctor.  ? Place a towel between your skin and the heat source.  ? Leave the heat on for 20-30 minutes.  ? Remove the heat if your skin turns bright red. This is very important if you are unable to feel pain, heat, or cold. You may have a greater risk of getting burned.  · If told, put ice on your knee.  ? If you have a removable sleeve or brace, remove it as told by your doctor.  ? Put ice in a plastic bag.  ? Place a towel between your skin and the bag.  ? Leave the ice on for 20 minutes, 2-3 times a day.  · Move your toes often.  · Raise (elevate) the injured area above the level of your heart while you are sitting or lying down.  Activity  · Avoid activities where both feet leave the ground at the same time (high-impact activities). Examples are running, jumping rope, and doing jumping jacks.  · Return to your normal activities as told by your doctor. Ask your doctor what activities are  safe for you.  · Follow the exercise plan that your doctor makes for you. Your doctor may suggest that you:  ? Avoid activities that make knee pain worse. You may need to change the exercises that you do, the sports that you participate in, or your job duties.  ? Wear shoes with cushioned soles.  ? Avoid high-impact activities or sports that require running and sudden changes in direction.  ? Do exercises or physical therapy as told by your doctor. Physical therapy is planned to match your needs and abilities.  ? Do exercises that increase your balance and strength, such as marian chi and yoga.  · Do not use your injured knee to support your body weight until your doctor says that you can. Use crutches, a cane, or a walker, as told by your doctor.  General instructions  · Take over-the-counter and prescription medicines only as told by your doctor.  · If you are overweight, work with your doctor and a food expert (dietitian) to set goals to lose weight. Being overweight can make your knee hurt more.  · Do not use any products that contain nicotine or tobacco, such as cigarettes, e-cigarettes, and chewing tobacco. If you need help quitting, ask your doctor.  · Keep all follow-up visits as told by your doctor. This is important.  Contact a doctor if:  · You have knee pain that is not getting better or gets worse.  · You are not able to do your exercises due to knee pain.  Get help right away if:  · Your knee swells and the swelling becomes worse.  · You cannot move your knee.  · You have very bad knee pain.  Summary  · Knee pain that lasts more than 3 months is considered chronic knee pain.  · The main treatments for chronic knee pain are physical therapy and weight loss. You may also need to take medicines, wear a knee sleeve or brace, use crutches, and put ice or heat on your knee.  · Lose weight if you are overweight. Work with your doctor and a food expert (dietitian) to help you set goals to lose weight. Being  overweight can make your knee hurt more.  · Work with a physical therapist to make a safe exercise program, as told by your doctor.  This information is not intended to replace advice given to you by your health care provider. Make sure you discuss any questions you have with your health care provider.  Document Released: 02/27/2020 Document Revised: 02/27/2020 Document Reviewed: 02/27/2020  Elsevier Patient Education © 2020 [employer]

## 2022-03-07 NOTE — ASSESSMENT & PLAN NOTE
This is a chronic and ongoing condition with exacerbation.  Patient continues to report that he has bilateral lower back pain due to lack of support in his shoes.  He reports his previous insoles from podiatry are no longer helping with his pain and he was requesting referral to podiatry.    He denies any radiculopathy, saddle paresthesia, or loss of bowel or bladder control.    Plan  Referral was placed to podiatry.  Discussed appropriate back body mechanics.

## 2022-03-07 NOTE — ASSESSMENT & PLAN NOTE
This is a chronic condition with exacerbation.  This condition has been chronic for at least 5 to 7 years.  Patient previously had knee surgery on his meniscus and ACL in  2011, 2013, 2014.  He currently wears a knee brace which was given to him by orthopedics however due to increased exercise with [employer] his upper thigh has become too big for this brace.  He does report that his pain and instability in his right knee is slightly worse.  Pain level 3-10/10 sharp pain at the top of his kneecap and laterally/medially to his kneecap.  He does report positive for swelling of his knee and lower leg on daily.  Positive for popping.   Positive for calf pain.    Denies any instability, buckling, or crepitus.  Home treatment:   Today he is requesting referral to sports medicine orthopedics.    Plan  Ultrasound order was placed today to rule out DVT.  Referral to sports medicine was placed.  Continue to encourage ice and massage therapy.  Tylenol as needed for analgesics.  Decrease weights with [employer].

## 2022-05-23 ENCOUNTER — HOSPITAL ENCOUNTER (OUTPATIENT)
Dept: LAB | Facility: MEDICAL CENTER | Age: 32
End: 2022-05-23
Attending: NURSE PRACTITIONER
Payer: COMMERCIAL

## 2022-05-23 DIAGNOSIS — R73.01 IMPAIRED FASTING BLOOD SUGAR: ICD-10-CM

## 2022-05-23 DIAGNOSIS — R74.01 ELEVATED ALT MEASUREMENT: ICD-10-CM

## 2022-05-23 LAB
ALBUMIN SERPL BCP-MCNC: 4.2 G/DL (ref 3.2–4.9)
ALP SERPL-CCNC: 70 U/L (ref 30–99)
ALT SERPL-CCNC: 51 U/L (ref 2–50)
AST SERPL-CCNC: 28 U/L (ref 12–45)
BILIRUB CONJ SERPL-MCNC: <0.2 MG/DL (ref 0.1–0.5)
BILIRUB INDIRECT SERPL-MCNC: ABNORMAL MG/DL (ref 0–1)
BILIRUB SERPL-MCNC: 0.3 MG/DL (ref 0.1–1.5)
EST. AVERAGE GLUCOSE BLD GHB EST-MCNC: 111 MG/DL
GLUCOSE SERPL-MCNC: 105 MG/DL (ref 65–99)
HBA1C MFR BLD: 5.5 % (ref 4–5.6)
PROT SERPL-MCNC: 6.8 G/DL (ref 6–8.2)

## 2022-05-23 PROCEDURE — 83036 HEMOGLOBIN GLYCOSYLATED A1C: CPT

## 2022-05-23 PROCEDURE — 36415 COLL VENOUS BLD VENIPUNCTURE: CPT

## 2022-05-23 PROCEDURE — 80076 HEPATIC FUNCTION PANEL: CPT

## 2022-05-23 PROCEDURE — 82947 ASSAY GLUCOSE BLOOD QUANT: CPT

## 2022-05-25 ENCOUNTER — OFFICE VISIT (OUTPATIENT)
Dept: MEDICAL GROUP | Facility: PHYSICIAN GROUP | Age: 32
End: 2022-05-25
Payer: COMMERCIAL

## 2022-05-25 VITALS
OXYGEN SATURATION: 95 % | TEMPERATURE: 96.7 F | DIASTOLIC BLOOD PRESSURE: 62 MMHG | HEIGHT: 75 IN | HEART RATE: 69 BPM | RESPIRATION RATE: 16 BRPM | BODY MASS INDEX: 32.58 KG/M2 | WEIGHT: 262 LBS | SYSTOLIC BLOOD PRESSURE: 138 MMHG

## 2022-05-25 DIAGNOSIS — R53.83 FATIGUE, UNSPECIFIED TYPE: ICD-10-CM

## 2022-05-25 DIAGNOSIS — G89.29 CHRONIC PAIN OF RIGHT KNEE: ICD-10-CM

## 2022-05-25 DIAGNOSIS — Z78.9 TAKES DIETARY SUPPLEMENTS: ICD-10-CM

## 2022-05-25 DIAGNOSIS — M25.561 CHRONIC PAIN OF RIGHT KNEE: ICD-10-CM

## 2022-05-25 DIAGNOSIS — S39.012D STRAIN OF LUMBAR REGION, SUBSEQUENT ENCOUNTER: ICD-10-CM

## 2022-05-25 DIAGNOSIS — E78.2 MIXED HYPERLIPIDEMIA: ICD-10-CM

## 2022-05-25 DIAGNOSIS — R74.01 ELEVATED ALT MEASUREMENT: ICD-10-CM

## 2022-05-25 DIAGNOSIS — R73.01 IMPAIRED FASTING BLOOD SUGAR: ICD-10-CM

## 2022-05-25 PROCEDURE — 99214 OFFICE O/P EST MOD 30 MIN: CPT | Performed by: NURSE PRACTITIONER

## 2022-05-25 NOTE — ASSESSMENT & PLAN NOTE
Chronic condition with improvement.  Patient reports that he has been doing less [employer] exercises, using over-the-counter analgesics, and ice which has helped with his knee pain.    He has not schedule appointment with physical therapy or sports medicine at this time.    Patient no longer needs knee brace.  Swelling has resolved.

## 2022-05-25 NOTE — ASSESSMENT & PLAN NOTE
Chronic condition with mild improvement.  Patient continues to express that he has fatigue.  Previous testosterone levels and TSH levels were normal.  We will repeat labs in 6 months.

## 2022-05-25 NOTE — ASSESSMENT & PLAN NOTE
Component      Latest Ref Rng & Units 9/28/2021 5/23/2022   AST(SGOT)      12 - 45 U/L 27 28   ALT(SGPT)      2 - 50 U/L 70 (H) 51 (H)   Alkaline Phosphatase      30 - 99 U/L 66 70     Chronic improving condition.  Patient continues to deny alcohol use.  He does continue the keto diet.  Denies any yellowing of the skin, right upper quadrant pain, nausea, or vomiting.    Repeat labs in 6 months.

## 2022-05-25 NOTE — PROGRESS NOTES
Chief Complaint   Patient presents with   • Follow-Up     labs       Subjective:     HPI:   Vikas Amos is a 32 y.o. male here to discuss the evaluation and management of:      Chronic pain of right knee  Chronic condition with improvement.  Patient reports that he has been doing less [employer] exercises, using over-the-counter analgesics, and ice which has helped with his knee pain.    He has not schedule appointment with physical therapy or sports medicine at this time.    Patient no longer needs knee brace.  Swelling has resolved.      Elevated ALT measurement  Component      Latest Ref Rng & Units 9/28/2021 5/23/2022   AST(SGOT)      12 - 45 U/L 27 28   ALT(SGPT)      2 - 50 U/L 70 (H) 51 (H)   Alkaline Phosphatase      30 - 99 U/L 66 70     Chronic improving condition.  Patient continues to deny alcohol use.  He does continue the keto diet.  Denies any yellowing of the skin, right upper quadrant pain, nausea, or vomiting.    Repeat labs in 6 months.    Fatigue  Chronic condition with mild improvement.  Patient continues to express that he has fatigue.  Previous testosterone levels and TSH levels were normal.  We will repeat labs in 6 months.    Impaired fasting blood sugar  Chronic condition.  Patient continues to have impaired fasting blood sugars without elevated A1c levels.  Patient is currently on keto diet.  Denies any polyuria, polydipsia, polyphagia.    Low back strain  Chronic condition with improvement.  Patient reports that he did follow-up with podiatry and was prescribed insoles however insurance did not cover it.  He has not followed up with sports medicine or physical therapy.    Takes dietary supplements  Patient is asking about the Muzu and over-the-counter testosterone supplements.  He would like to know if it is safe to take them        ROS:  Gen: no fevers/chills, no changes in weight  Pulm: no sob, no cough  CV: no chest pain, no palpitations  GI: no nausea/vomiting, no  "diarrhea  MSk: Per HPI  Neuro: no headaches, no numbness/tingling      Allergies   Allergen Reactions   • Nkda [No Known Drug Allergy]        Current medicines (including changes today)  Current Outpatient Medications   Medication Sig Dispense Refill   • Vitamin D 12.5 MCG/0.25ML Liquid Take 100,000 Units by mouth.     • Multiple Vitamin (ONE-A-DAY MENS PO) Take  by mouth every day.     • Cyanocobalamin (VITAMIN B 12 PO) Take  by mouth every day. (Patient not taking: Reported on 5/25/2022)       No current facility-administered medications for this visit.          Objective:       /62   Pulse 69   Temp 35.9 °C (96.7 °F) (Temporal)   Resp 16   Ht 1.905 m (6' 3\")   Wt 119 kg (262 lb)   SpO2 95%  Body mass index is 32.75 kg/m².    Physical Exam:  Constitutional: Well-developed and well-nourished male in NAD. Not diaphoretic. No distress.   Skin: warm, dry, intact  Cardiovascular: Regular rate and rhythm without murmur. Radial pulses are intact and equal bilaterally.  Pulmonary: Clear to ausculation. Normal effort. No rales, ronchi, or wheezing.  Abdomen: Soft, non tender, and without distention. Active bowel sounds in all four quadrants.   Extremities: No cyanosis, clubbing, erythema, nor edema.   Neurological: Alert and oriented x 3.   Psychiatric:  Behavior, mood, and affect are appropriate.      Assessment and Plan:     The following treatment plan was discussed:  I have reviewed labs with patient and answered all questions.    1. Fatigue, unspecified type  Chronic condition.  Labs ordered as indicated below.  Encourage patient to get adequate amounts of sleep, exercise regularly, and eat healthy diet.  - CBC WITH DIFFERENTIAL; Future  - TESTOSTERONE SERUM; Future  - VITAMIN D,25 HYDROXY; Future    2. Impaired fasting blood sugar  Chronic condition.  Patient is asymptomatic.  We will continue to monitor.  Discussed appropriate diet lifestyle modifications.  - Comp Metabolic Panel; Future  - HEMOGLOBIN " A1C; Future    3. Elevated ALT measurement  Chronic condition with improvement.  Discussed with patient multiple differential diagnoses.  Encourage patient to continue to not drink alcohol.  Follow-up labs in 6 months.    4. Mixed hyperlipidemia  Chronic condition.  Discussed appropriate diet lifestyle modifications.  Labs are as indicated below.  - Lipid Profile; Future    5. Strain of lumbar region, subsequent encounter  Chronic condition.  Encourage patient to wear supportive shoes.  Contact information to sports medicine and physical therapy was given discharge instructions.  Patient may continue to use over-the-counter analgesics, heat, or ice.    6. Chronic pain of right knee  Chronic and improving.  Encourage patient to follow-up with physical therapy and sports medicine.  Continue with over-the-counter analgesics, heat, and ice.    7. Takes dietary supplements  At length discussion with patient regards to online supplements and not being FDA approved.  Encourage patient to call companies to discuss specific injury gradients.  Did discuss with patient about side effects that may occur when taking dietary supplements.    Any change or worsening of signs or symptoms, patient encouraged to follow-up or report to urgent care or emergency room for further evaluation. Patient verbalizes understanding and agrees.    Follow-Up: Return in about 6 months (around 11/25/2022) for Follow up labs.      PLEASE NOTE: This dictation was created using voice recognition software. I have made every reasonable attempt to correct obvious errors, but I expect that there are errors of grammar and possibly content that I did not discover before finalizing the note.

## 2022-05-25 NOTE — ASSESSMENT & PLAN NOTE
Patient is asking about the Muzu and over-the-counter testosterone supplements.  He would like to know if it is safe to take them

## 2022-05-25 NOTE — ASSESSMENT & PLAN NOTE
Chronic condition.  Patient continues to have impaired fasting blood sugars without elevated A1c levels.  Patient is currently on keto diet.  Denies any polyuria, polydipsia, polyphagia.

## 2022-05-25 NOTE — ASSESSMENT & PLAN NOTE
Chronic condition with improvement.  Patient reports that he did follow-up with podiatry and was prescribed insoles however insurance did not cover it.  He has not followed up with sports medicine or physical therapy.

## 2022-05-25 NOTE — PATIENT INSTRUCTIONS
"Podiatry      [facility]  [address]  [place] [address]  Phone: [phone]   Fax: [phone]    Sports Medicine     [facility]  [address] [place] [address]  [place] [address]  Phone: [phone]    Physical Therapy      [place]  [facility]  [address] [place] [address]  [place] [address]  Phone: [phone]     High Cholesterol    High cholesterol is a condition in which the blood has high levels of a white, waxy, fat-like substance (cholesterol). The human body needs small amounts of cholesterol. The liver makes all the cholesterol that the body needs. Extra (excess) cholesterol comes from the food that we eat.  Cholesterol is carried from the liver by the blood through the blood vessels. If you have high cholesterol, deposits (plaques) may build up on the walls of your blood vessels (arteries). Plaques make the arteries narrower and stiffer. Cholesterol plaques increase your risk for heart attack and stroke. Work with your health care provider to keep your cholesterol levels in a healthy range.  What increases the risk?  This condition is more likely to develop in people who:  Eat foods that are high in animal fat (saturated fat) or cholesterol.  Are overweight.  Are not getting enough exercise.  Have a family history of high cholesterol.  What are the signs or symptoms?  There are no symptoms of this condition.  How is this diagnosed?  This condition may be diagnosed from the results of a blood test.  If you are older than age 20, your health care provider may check your cholesterol every 4-6 years.  You may be checked more often if you already have high cholesterol or other risk factors for heart disease.  The blood test for cholesterol measures:  \"Bad\" cholesterol (LDL cholesterol). This is the main type of cholesterol that causes heart disease. The desired level for LDL is less than 100.  \"Good\" cholesterol (HDL cholesterol). This type helps to protect against " heart disease by cleaning the arteries and carrying the LDL away. The desired level for HDL is 60 or higher.  Triglycerides. These are fats that the body can store or burn for energy. The desired number for triglycerides is lower than 150.  Total cholesterol. This is a measure of the total amount of cholesterol in your blood, including LDL cholesterol, HDL cholesterol, and triglycerides. A healthy number is less than 200.  How is this treated?  This condition is treated with diet changes, lifestyle changes, and medicines.  Diet changes  This may include eating more whole grains, fruits, vegetables, nuts, and fish.  This may also include cutting back on red meat and foods that have a lot of added sugar.  Lifestyle changes  Changes may include getting at least 40 minutes of aerobic exercise 3 times a week. Aerobic exercises include walking, biking, and swimming. Aerobic exercise along with a healthy diet can help you maintain a healthy weight.  Changes may also include quitting smoking.  Medicines  Medicines are usually given if diet and lifestyle changes have failed to reduce your cholesterol to healthy levels.  Your health care provider may prescribe a statin medicine. Statin medicines have been shown to reduce cholesterol, which can reduce the risk of heart disease.  Follow these instructions at home:  Eating and drinking  If told by your health care provider:  Eat chicken (without skin), fish, veal, shellfish, ground turkey breast, and round or loin cuts of red meat.  Do not eat fried foods or fatty meats, such as hot dogs and salami.  Eat plenty of fruits, such as apples.  Eat plenty of vegetables, such as broccoli, potatoes, and carrots.  Eat beans, peas, and lentils.  Eat grains such as barley, rice, couscous, and bulgur wheat.  Eat pasta without cream sauces.  Use skim or nonfat milk, and eat low-fat or nonfat yogurt and cheeses.  Do not eat or drink whole milk, cream, ice cream, egg yolks, or hard  cheeses.  Do not eat stick margarine or tub margarines that contain trans fats (also called partially hydrogenated oils).  Do not eat saturated tropical oils, such as coconut oil and palm oil.  Do not eat cakes, cookies, crackers, or other baked goods that contain trans fats.    General instructions  Exercise as directed by your health care provider. Increase your activity level with activities such as gardening, walking, and taking the stairs.  Take over-the-counter and prescription medicines only as told by your health care provider.  Do not use any products that contain nicotine or tobacco, such as cigarettes and e-cigarettes. If you need help quitting, ask your health care provider.  Keep all follow-up visits as told by your health care provider. This is important.  Contact a health care provider if:  You are struggling to maintain a healthy diet or weight.  You need help to start on an exercise program.  You need help to stop smoking.  Get help right away if:  You have chest pain.  You have trouble breathing.  This information is not intended to replace advice given to you by your health care provider. Make sure you discuss any questions you have with your health care provider.  Document Released: 12/18/2006 Document Revised: 12/21/2018 Document Reviewed: 06/17/2017  Elsevier Patient Education © 2020 [employer]

## 2022-07-02 ENCOUNTER — OFFICE VISIT (OUTPATIENT)
Dept: URGENT CARE | Facility: PHYSICIAN GROUP | Age: 32
End: 2022-07-02
Payer: COMMERCIAL

## 2022-07-02 VITALS
WEIGHT: 253 LBS | HEART RATE: 74 BPM | HEIGHT: 75 IN | BODY MASS INDEX: 31.46 KG/M2 | SYSTOLIC BLOOD PRESSURE: 146 MMHG | RESPIRATION RATE: 20 BRPM | OXYGEN SATURATION: 96 % | DIASTOLIC BLOOD PRESSURE: 72 MMHG | TEMPERATURE: 97.1 F

## 2022-07-02 DIAGNOSIS — R09.82 PND (POST-NASAL DRIP): ICD-10-CM

## 2022-07-02 DIAGNOSIS — J02.9 SORE THROAT: ICD-10-CM

## 2022-07-02 DIAGNOSIS — J02.0 STREP THROAT: Primary | ICD-10-CM

## 2022-07-02 LAB
INT CON NEG: NORMAL
INT CON POS: NORMAL
S PYO AG THROAT QL: POSITIVE

## 2022-07-02 PROCEDURE — 87880 STREP A ASSAY W/OPTIC: CPT | Performed by: NURSE PRACTITIONER

## 2022-07-02 PROCEDURE — 99214 OFFICE O/P EST MOD 30 MIN: CPT | Performed by: NURSE PRACTITIONER

## 2022-07-02 RX ORDER — AMOXICILLIN 500 MG/1
500 CAPSULE ORAL 2 TIMES DAILY
Qty: 20 CAPSULE | Refills: 0 | Status: SHIPPED | OUTPATIENT
Start: 2022-07-02 | End: 2022-07-12

## 2022-07-02 NOTE — PROGRESS NOTES
Vikas Amos is a 32 y.o. male who presents for Pharyngitis (X 3 days white spots on tonsils.)      SUNIL Bean is a 32-year-old male who presents with a sore throat.  Symptoms started 3 days ago.  He now has white spots on his tonsils.  He has pain with swallowing.  He has acute pain in his neck on the sides.  He has had some chills but has not measured a fever.  He has no nausea, vomiting, coughing, nasal drainage, or sinus pressure.  He does have some postnasal drip that he feels.  He has a history of some environmental allergies.  No other aggravating or alleviating factors.    ROS    Allergies:       Allergies   Allergen Reactions   • Nkda [No Known Drug Allergy]        PMSFS Hx:  Past Medical History:   Diagnosis Date   • Anesthesia     PONV   • Mixed hyperlipidemia 10/5/2021   • Pain    • Scalp alopecia 2/24/2015   • Sleep apnea     cpap   • Snoring      Past Surgical History:   Procedure Laterality Date   • UVULOPHARYNGOPALATOPLASTY N/A 12/1/2015    Procedure: UVULOPHARYNGOPALATOPLASTY for: palatopharyngoplasty ;  Surgeon: Karlo De Dios M.D.;  Location: SURGERY SAME DAY [facility];  Service:    • SEPTOPLASTY N/A 12/1/2015    Procedure: SEPTOPLASTY;  Surgeon: Karlo De Dios M.D.;  Location: SURGERY SAME DAY [facility];  Service:    • TURBINATE REDUCTION Bilateral 12/1/2015    Procedure: TURBINATE REDUCTION resection with submucosal approach;  Surgeon: Karlo De Dios M.D.;  Location: SURGERY SAME DAY [facility];  Service:    • TONSILLECTOMY Bilateral 12/1/2015    Procedure: TONSILLECTOMY;  Surgeon: Karlo De Dios M.D.;  Location: SURGERY SAME DAY [facility];  Service:    • ORIF, WRIST  12/2/08    Performed by PEGGY PANIAGUA at [facility] ORS   • HERNIA REP ING CHILD  1991   • HERNIA REPAIR     • KNEE RECONSTRUCTION Right     x 3 surgeries     Family History   Problem Relation Age of Onset   • No Known Problems Father    • Diabetes Maternal Grandmother    • Hypertension Maternal  "Grandmother    • No Known Problems Maternal Grandfather    • No Known Problems Paternal Grandfather      Social History     Tobacco Use   • Smoking status: Never Smoker   • Smokeless tobacco: Never Used   Substance Use Topics   • Alcohol use: Yes     Comment: occassional       Problems:   Patient Active Problem List   Diagnosis   • Scalp alopecia   • H/O folliculitis   • Low back strain   • Sleep apnea   • Chronic pain of left knee   • Chronic intermittent hypoxia with obstructive sleep apnea   • Visual disturbance   • Radiculitis   • Fatigue   • Encounter to establish care   • Impaired fasting blood sugar   • Mixed hyperlipidemia   • Elevated ALT measurement   • Chronic pain of right knee   • Takes dietary supplements       Medications:   Current Outpatient Medications on File Prior to Visit   Medication Sig Dispense Refill   • Vitamin D 12.5 MCG/0.25ML Liquid Take 100,000 Units by mouth.     • Cyanocobalamin (VITAMIN B 12 PO) Take  by mouth every day.     • Multiple Vitamin (ONE-A-DAY MENS PO) Take  by mouth every day.       No current facility-administered medications on file prior to visit.          Objective:     BP (!) 146/72   Pulse 74   Temp 36.2 °C (97.1 °F) (Temporal)   Resp 20   Ht 1.905 m (6' 3\")   Wt 115 kg (253 lb)   SpO2 96%   BMI 31.62 kg/m²     Physical Exam  Vitals reviewed.   Constitutional:       General: He is not in acute distress.     Appearance: Normal appearance. He is well-developed. He is not ill-appearing or toxic-appearing.   HENT:      Head: Normocephalic.      Right Ear: Hearing, tympanic membrane, ear canal and external ear normal.      Left Ear: Hearing, tympanic membrane, ear canal and external ear normal.      Nose: Nose normal.      Mouth/Throat:      Pharynx: Uvula midline. Oropharyngeal exudate (Postnasal drip) and posterior oropharyngeal erythema present.   Eyes:      General: Lids are normal.      Pupils: Pupils are equal, round, and reactive to light.   Neck:      " Trachea: Trachea and phonation normal.   Cardiovascular:      Rate and Rhythm: Normal rate and regular rhythm.      Heart sounds: Normal heart sounds.   Pulmonary:      Effort: Pulmonary effort is normal. No respiratory distress.      Breath sounds: Normal breath sounds.   Abdominal:      General: Bowel sounds are normal.      Palpations: Abdomen is soft.   Musculoskeletal:         General: Normal range of motion.      Cervical back: Normal range of motion and neck supple.   Lymphadenopathy:      Head:      Right side of head: Tonsillar adenopathy present.      Left side of head: Tonsillar adenopathy present.      Cervical: No cervical adenopathy.   Skin:     General: Skin is warm and dry.   Neurological:      Mental Status: He is alert and oriented to person, place, and time.   Psychiatric:         Behavior: Behavior normal.         Thought Content: Thought content normal.       Results for orders placed or performed in visit on 07/02/22   POCT Rapid Strep A   Result Value Ref Range    Rapid Strep Screen POSITIVE     Internal Control Positive Valid     Internal Control Negative Valid          Assessment /Associated Orders:      1. Strep throat  amoxicillin (AMOXIL) 500 MG Cap   2. Sore throat  POCT Rapid Strep A   3. PND (post-nasal drip)         Medical Decision Making:    Pt is clinically stable at today's acute urgent care visit.  No acute distress noted. Appropriate for outpatient management at this time.   Acute problem today .   Educated in proper administration of medication(s) ordered today including safety, possible SE, risks, benefits, rationale and alternatives to therapy.   Educated in infection control practices.   OTC  analgesic of choice (acetaminophen or NSAID) prn pain. Follow manufactures dosing and safety precautions.   Salt water gargles BID and prn. Suggested 1/4 to 1/2 teaspoon (1.5 to 3.0 g) of salt per one cup (8 ounces or 250 mL) of warm water.   OTC throat analgesic spray or lozenge of  choice prn throat pain. Dosage and directions per     OTC antihistamine of choice. Follow manufactures dosing and safety guidelines.     • Discussed DDx, management options (risks,benefits, and alternatives to planned treatment), natural progression and supportive care.  Expressed understanding and the treatment plan was agreed upon. Questions were encouraged and answered   • Return to urgent care prn if new or worsening sx or if there is no improvement in condition prn.    • Educated in Red flags and indications to immediately call 911 or present to the Emergency Department.         Time spent evaluating Vikas Amos in urgent care today for his acute illness was at least 30 minutes. This time includes preparing for visit, counseling/education, exam and evaluation, obtaining history, independent interpretation, ordering lab/test/procedures, medication management and documentation as indicated above.Time does not include separately billable procedures noted .       Please note that this dictation was created using voice recognition software. I have worked with consultants from the vendor as well as technical experts from [facility] to optimize the interface. I have made every reasonable attempt to correct obvious errors, but I expect that there are errors of grammar and possibly content that I did not discover before finalizing the note.  This note was electronically signed by provider

## 2023-07-26 ENCOUNTER — OFFICE VISIT (OUTPATIENT)
Dept: URGENT CARE | Facility: PHYSICIAN GROUP | Age: 33
End: 2023-07-26
Payer: COMMERCIAL

## 2023-07-26 VITALS
TEMPERATURE: 98.2 F | SYSTOLIC BLOOD PRESSURE: 124 MMHG | OXYGEN SATURATION: 98 % | WEIGHT: 269 LBS | HEIGHT: 76 IN | DIASTOLIC BLOOD PRESSURE: 80 MMHG | HEART RATE: 76 BPM | BODY MASS INDEX: 32.76 KG/M2 | RESPIRATION RATE: 16 BRPM

## 2023-07-26 DIAGNOSIS — J02.9 SORE THROAT: ICD-10-CM

## 2023-07-26 LAB — S PYO DNA SPEC NAA+PROBE: NOT DETECTED

## 2023-07-26 PROCEDURE — 3079F DIAST BP 80-89 MM HG: CPT | Performed by: PHYSICIAN ASSISTANT

## 2023-07-26 PROCEDURE — 87651 STREP A DNA AMP PROBE: CPT | Performed by: PHYSICIAN ASSISTANT

## 2023-07-26 PROCEDURE — 99213 OFFICE O/P EST LOW 20 MIN: CPT | Performed by: PHYSICIAN ASSISTANT

## 2023-07-26 PROCEDURE — 3074F SYST BP LT 130 MM HG: CPT | Performed by: PHYSICIAN ASSISTANT

## 2023-07-26 ASSESSMENT — ENCOUNTER SYMPTOMS
TROUBLE SWALLOWING: 1
SWOLLEN GLANDS: 1
HEADACHES: 0
VOMITING: 0
FEVER: 0
CHILLS: 0
SHORTNESS OF BREATH: 0
NAUSEA: 0
DIARRHEA: 0
ABDOMINAL PAIN: 0
WHEEZING: 0
DIZZINESS: 0
CARDIOVASCULAR NEGATIVE: 1
SORE THROAT: 1
COUGH: 0
MYALGIAS: 0

## 2023-07-26 NOTE — PROGRESS NOTES
Subjective     Vikas Amos is a very pleasant 33 y.o. male who presents with Pharyngitis (X 3 days, pt states he finished his last antibiotic for strep one week ago. Pt states he did notice some white spots )            Patient was treated for strep and finished amoxicillin approximate 2 weeks ago.  He does report resolution of his symptoms but he has had some ongoing congestion, sore throat and generalized malaise.    Pharyngitis   This is a new problem. The current episode started 1 to 4 weeks ago. The problem has been unchanged. There has been no fever. The fever has been present for Less than 1 day. Associated symptoms include swollen glands and trouble swallowing. Pertinent negatives include no abdominal pain, congestion, coughing, diarrhea, ear pain, headaches, shortness of breath or vomiting.       PMH:  has a past medical history of Anesthesia, Mixed hyperlipidemia (10/5/2021), Pain, Scalp alopecia (2/24/2015), Sleep apnea, and Snoring.    He has no past medical history of Anemia, Anxiety, Arrhythmia, Arthritis, Asthma, Blood transfusion without reported diagnosis, Cancer ([facility]), CHF (congestive heart failure) ([facility]), Clotting disorder (HCC), COPD (chronic obstructive pulmonary disease) ([facility]), Depression, Diabetes (HCC), GERD (gastroesophageal reflux disease), Glaucoma, Heart attack ([facility]), Heart murmur, HIV (human immunodeficiency virus infection) ([facility]), Hypertension, Kidney disease, Migraine, Seizure ([facility]), Stroke ([facility]), Substance abuse ([facility]), or Thyroid disease.  MEDS:   Current Outpatient Medications:     Vitamin D 12.5 MCG/0.25ML Liquid, Take 100,000 Units by mouth., Disp: , Rfl:     Cyanocobalamin (VITAMIN B 12 PO), Take  by mouth every day., Disp: , Rfl:     Multiple Vitamin (ONE-A-DAY MENS PO), Take  by mouth every day., Disp: , Rfl:   ALLERGIES:   Allergies   Allergen Reactions    Nkda [No Known Drug Allergy]      SURGHX:   Past Surgical History:   Procedure Laterality Date     "UVULOPHARYNGOPALATOPLASTY N/A 12/1/2015    Procedure: UVULOPHARYNGOPALATOPLASTY for: palatopharyngoplasty ;  Surgeon: Karlo De Dios M.D.;  Location: SURGERY SAME DAY [facility];  Service:     SEPTOPLASTY N/A 12/1/2015    Procedure: SEPTOPLASTY;  Surgeon: Karlo De Dios M.D.;  Location: SURGERY SAME DAY [facility];  Service:     TURBINATE REDUCTION Bilateral 12/1/2015    Procedure: TURBINATE REDUCTION resection with submucosal approach;  Surgeon: Karlo De Dios M.D.;  Location: SURGERY SAME DAY [facility];  Service:     TONSILLECTOMY Bilateral 12/1/2015    Procedure: TONSILLECTOMY;  Surgeon: Karlo De Dios M.D.;  Location: SURGERY SAME DAY [facility] ORS;  Service:     ORIF, WRIST  12/2/08    Performed by PEGGY PANIAGUA at SURGERY [facility]    HERNIA REP ING CHILD  1991    HERNIA REPAIR      KNEE RECONSTRUCTION Right     x 3 surgeries     SOCHX:  reports that he has never smoked. He has never used smokeless tobacco. He reports current alcohol use. He reports that he does not use drugs.  FH: family history includes Diabetes in his maternal grandmother; Hypertension in his maternal grandmother; No Known Problems in his father, maternal grandfather, and paternal grandfather.      Review of Systems   Constitutional:  Negative for chills and fever.   HENT:  Positive for sore throat and trouble swallowing. Negative for congestion and ear pain.    Respiratory:  Negative for cough, shortness of breath and wheezing.    Cardiovascular: Negative.    Gastrointestinal:  Negative for abdominal pain, diarrhea, nausea and vomiting.   Musculoskeletal:  Negative for myalgias.   Neurological:  Negative for dizziness and headaches.       Medications, Allergies, and current problem list reviewed today in Epic           Objective     /80   Pulse 76   Temp 36.8 °C (98.2 °F) (Temporal)   Resp 16   Ht 1.93 m (6' 4\")   Wt 122 kg (269 lb)   SpO2 98%   BMI 32.74 kg/m²      Physical Exam  Vitals and nursing note reviewed. "   Constitutional:       General: He is not in acute distress.     Appearance: Normal appearance. He is well-developed. He is not ill-appearing, toxic-appearing or diaphoretic.   HENT:      Head: Normocephalic and atraumatic.      Right Ear: Tympanic membrane, ear canal and external ear normal.      Left Ear: Tympanic membrane, ear canal and external ear normal.      Nose: Nose normal. No congestion or rhinorrhea.      Mouth/Throat:      Mouth: Mucous membranes are moist.      Pharynx: Oropharynx is clear. No pharyngeal swelling, oropharyngeal exudate or posterior oropharyngeal erythema.      Comments: Palatal scarring noted  Eyes:      General:         Right eye: No discharge.         Left eye: No discharge.      Conjunctiva/sclera: Conjunctivae normal.   Cardiovascular:      Rate and Rhythm: Normal rate and regular rhythm.      Pulses: Normal pulses.      Heart sounds: Normal heart sounds. No murmur heard.  Pulmonary:      Effort: Pulmonary effort is normal. No respiratory distress.      Breath sounds: Normal breath sounds. No wheezing, rhonchi or rales.   Chest:      Chest wall: No tenderness.   Musculoskeletal:         General: No swelling or tenderness.      Cervical back: Normal range of motion and neck supple.      Right lower leg: No edema.      Left lower leg: No edema.   Lymphadenopathy:      Cervical: No cervical adenopathy.   Skin:     General: Skin is warm and dry.   Neurological:      General: No focal deficit present.      Mental Status: He is alert and oriented to person, place, and time. Mental status is at baseline.   Psychiatric:         Mood and Affect: Mood normal.         Behavior: Behavior normal.         Thought Content: Thought content normal.         Judgment: Judgment normal.                             Assessment & Plan     This is a very pleasant 33-year-old male presenting with ongoing sore throat, congestion and malaise for the last several weeks.  Finished a course of amoxicillin  approximate 2 weeks ago for strep pharyngitis and does report complete resolution at that time.  Symptoms have been ongoing and mild.  No fever chills or body aches.  No vomiting diarrhea or lower respiratory symptoms.  Patient does have a history of allergic rhinitis.  Vital signs are normal.  Posterior oropharynx clear without signs of swelling, erythema or exudate.  Palatal scarring noted secondary to previous tonsillectomy and uvulectomy.  Remainder of exam unremarkable.  In clinic strep testing negative.  No signs of focal bacterial infection.  Allergic versus viral.  OTC meds and conservative measures as discussed.    1. Sore throat  POCT CEPHEID GROUP A STREP - PCR          I personally reviewed prior external notes and test results pertinent to today's visit. Return to clinic or go to ED if symptoms worsen or persist. Red flag symptoms and indications for ED discussed at length. Patient/Parent/Guardian voices understanding. Follow-up with your primary care provider in 3-5 days. All side effects and potential interactions of prescribed medication discussed including allergic response, GI upset, tendon injury, rash, sedation, OCP effectiveness, etc.    Please note that this dictation was created using voice recognition software. I have made every reasonable attempt to correct obvious errors, but I expect that there are errors of grammar and possibly content that I did not discover before finalizing the note.